# Patient Record
Sex: FEMALE | Race: WHITE | NOT HISPANIC OR LATINO | URBAN - METROPOLITAN AREA
[De-identification: names, ages, dates, MRNs, and addresses within clinical notes are randomized per-mention and may not be internally consistent; named-entity substitution may affect disease eponyms.]

---

## 2018-06-14 ENCOUNTER — OFFICE VISIT (OUTPATIENT)
Dept: PRIMARY CARE | Facility: CLINIC | Age: 63
End: 2018-06-14
Payer: COMMERCIAL

## 2018-06-14 VITALS
HEIGHT: 64 IN | BODY MASS INDEX: 31.82 KG/M2 | WEIGHT: 186.4 LBS | DIASTOLIC BLOOD PRESSURE: 70 MMHG | TEMPERATURE: 97.9 F | SYSTOLIC BLOOD PRESSURE: 110 MMHG

## 2018-06-14 DIAGNOSIS — H65.93 SOM (SECRETORY OTITIS MEDIA), BILATERAL: Primary | ICD-10-CM

## 2018-06-14 DIAGNOSIS — Z12.11 COLON CANCER SCREENING: ICD-10-CM

## 2018-06-14 DIAGNOSIS — E06.3 HASHIMOTO'S THYROIDITIS: ICD-10-CM

## 2018-06-14 DIAGNOSIS — J30.9 ALLERGIC RHINITIS, UNSPECIFIED CHRONICITY, UNSPECIFIED SEASONALITY, UNSPECIFIED TRIGGER: ICD-10-CM

## 2018-06-14 DIAGNOSIS — Z12.39 BREAST CANCER SCREENING: ICD-10-CM

## 2018-06-14 PROCEDURE — 99214 OFFICE O/P EST MOD 30 MIN: CPT | Performed by: FAMILY MEDICINE

## 2018-06-14 RX ORDER — METHYLPREDNISOLONE 4 MG/1
TABLET ORAL
Qty: 21 TABLET | Refills: 0 | Status: SHIPPED | OUTPATIENT
Start: 2018-06-14 | End: 2019-05-16

## 2018-06-14 RX ORDER — LEVOTHYROXINE SODIUM 25 UG/1
TABLET ORAL
COMMUNITY
Start: 2017-04-14 | End: 2018-09-06

## 2018-06-14 NOTE — PROGRESS NOTES
"Subjective      Patient ID: Sarina Lao is a 63 y.o. female.    HPI  Here for 1 wk increasing pressure in ears, no fever, no sore throat, has known allergies and uses nasacort with some relief. No h/a    The following have been reviewed and updated as appropriate in this visit:       Review of Systems    Objective     Vitals:    06/14/18 1719 06/14/18 1728   BP:  110/70   Temp: 36.6 °C (97.9 °F)    Weight: 84.6 kg (186 lb 6.4 oz)    Height: 1.613 m (5' 3.5\")      Body mass index is 32.5 kg/m².    Physical Exam   Constitutional: She appears well-developed and well-nourished. No distress.   HENT:   Head: Normocephalic and atraumatic.   Right Ear: External ear normal.   Left Ear: External ear normal.   Mouth/Throat: Oropharynx is clear and moist.   Nasal turbs blue and boggy  tms R dull, canal erythema  L fluid bubbles     Skin: She is not diaphoretic.   Psychiatric: She has a normal mood and affect. Her behavior is normal.   Vitals reviewed.      Assessment/Plan   Problem List Items Addressed This Visit        Other    Hashimoto's thyroiditis    Relevant Medications    levothyroxine (SYNTHROID) 25 mcg tablet    methylPREDNISolone (MEDROL DOSEPACK) 4 mg tablet    Other Relevant Orders    CBC and differential    Comprehensive metabolic panel    Lipid panel    TSH    T4, free    Breast cancer screening    Relevant Orders    BI SCREENING MAMMOGRAM BILATERAL    Colon cancer screening    Relevant Orders    Fecal Immunochemical      Other Visit Diagnoses     DEB (secretory otitis media), bilateral    -  Primary    medrol dosepak to relieve pressure  re ck at physical in 2 wks    Allergic rhinitis, unspecified chronicity, unspecified seasonality, unspecified trigger        cont nasacort, add allergra          "

## 2018-09-02 LAB
ALBUMIN SERPL-MCNC: 4.1 G/DL (ref 3.6–5.1)
ALBUMIN/GLOB SERPL: 1.6 (CALC) (ref 1–2.5)
ALP SERPL-CCNC: 66 U/L (ref 33–130)
ALT SERPL-CCNC: 16 U/L (ref 6–29)
AST SERPL-CCNC: 15 U/L (ref 10–35)
BASOPHILS # BLD AUTO: 60 CELLS/UL (ref 0–200)
BASOPHILS NFR BLD AUTO: 1.2 %
BILIRUB SERPL-MCNC: 0.9 MG/DL (ref 0.2–1.2)
BUN SERPL-MCNC: 17 MG/DL (ref 7–25)
BUN/CREAT SERPL: ABNORMAL (CALC) (ref 6–22)
CALCIUM SERPL-MCNC: 9.3 MG/DL (ref 8.6–10.4)
CHLORIDE SERPL-SCNC: 103 MMOL/L (ref 98–110)
CHOLEST SERPL-MCNC: 242 MG/DL
CHOLEST/HDLC SERPL: 3.1 (CALC)
CO2 SERPL-SCNC: 29 MMOL/L (ref 20–32)
CREAT SERPL-MCNC: 0.81 MG/DL (ref 0.5–0.99)
EOSINOPHIL # BLD AUTO: 190 CELLS/UL (ref 15–500)
EOSINOPHIL NFR BLD AUTO: 3.8 %
ERYTHROCYTE [DISTWIDTH] IN BLOOD BY AUTOMATED COUNT: 12.7 % (ref 11–15)
GFR SERPL CREATININE-BSD FRML MDRD: 78 ML/MIN/1.73M2
GLOBULIN SER CALC-MCNC: 2.6 G/DL (CALC) (ref 1.9–3.7)
GLUCOSE SERPL-MCNC: 106 MG/DL (ref 65–99)
HCT VFR BLD AUTO: 40.8 % (ref 35–45)
HDLC SERPL-MCNC: 78 MG/DL
HGB BLD-MCNC: 13.8 G/DL (ref 11.7–15.5)
LDLC SERPL CALC-MCNC: 145 MG/DL (CALC)
LYMPHOCYTES # BLD AUTO: 1635 CELLS/UL (ref 850–3900)
LYMPHOCYTES NFR BLD AUTO: 32.7 %
MCH RBC QN AUTO: 31.3 PG (ref 27–33)
MCHC RBC AUTO-ENTMCNC: 33.8 G/DL (ref 32–36)
MCV RBC AUTO: 92.5 FL (ref 80–100)
MONOCYTES # BLD AUTO: 385 CELLS/UL (ref 200–950)
MONOCYTES NFR BLD AUTO: 7.7 %
NEUTROPHILS # BLD AUTO: 2730 CELLS/UL (ref 1500–7800)
NEUTROPHILS NFR BLD AUTO: 54.6 %
NONHDLC SERPL-MCNC: 164 MG/DL (CALC)
PLATELET # BLD AUTO: 286 THOUSAND/UL (ref 140–400)
PMV BLD REES-ECKER: 11.1 FL (ref 7.5–12.5)
POTASSIUM SERPL-SCNC: 4.2 MMOL/L (ref 3.5–5.3)
PROT SERPL-MCNC: 6.7 G/DL (ref 6.1–8.1)
RBC # BLD AUTO: 4.41 MILLION/UL (ref 3.8–5.1)
SODIUM SERPL-SCNC: 140 MMOL/L (ref 135–146)
T4 FREE SERPL-MCNC: 0.9 NG/DL (ref 0.8–1.8)
TRIGL SERPL-MCNC: 83 MG/DL
TSH SERPL-ACNC: 3.92 MIU/L (ref 0.4–4.5)
WBC # BLD AUTO: 5 THOUSAND/UL (ref 3.8–10.8)

## 2018-09-06 ENCOUNTER — OFFICE VISIT (OUTPATIENT)
Dept: PRIMARY CARE | Facility: CLINIC | Age: 63
End: 2018-09-06
Payer: COMMERCIAL

## 2018-09-06 VITALS
RESPIRATION RATE: 18 BRPM | WEIGHT: 183.4 LBS | TEMPERATURE: 98 F | HEART RATE: 91 BPM | DIASTOLIC BLOOD PRESSURE: 90 MMHG | SYSTOLIC BLOOD PRESSURE: 110 MMHG | HEIGHT: 63 IN | BODY MASS INDEX: 32.5 KG/M2 | OXYGEN SATURATION: 98 %

## 2018-09-06 DIAGNOSIS — E78.2 MIXED HYPERLIPIDEMIA: ICD-10-CM

## 2018-09-06 DIAGNOSIS — H93.13 TINNITUS OF BOTH EARS: ICD-10-CM

## 2018-09-06 DIAGNOSIS — Z12.39 BREAST CANCER SCREENING: ICD-10-CM

## 2018-09-06 DIAGNOSIS — Z23 FLU VACCINE NEED: Primary | ICD-10-CM

## 2018-09-06 DIAGNOSIS — Z00.00 ENCOUNTER FOR GENERAL ADULT MEDICAL EXAMINATION WITHOUT ABNORMAL FINDINGS: ICD-10-CM

## 2018-09-06 DIAGNOSIS — E06.3 HASHIMOTO'S THYROIDITIS: ICD-10-CM

## 2018-09-06 DIAGNOSIS — Z12.11 COLON CANCER SCREENING: ICD-10-CM

## 2018-09-06 DIAGNOSIS — K59.00 CONSTIPATION, UNSPECIFIED CONSTIPATION TYPE: ICD-10-CM

## 2018-09-06 PROCEDURE — 99213 OFFICE O/P EST LOW 20 MIN: CPT | Mod: 25 | Performed by: FAMILY MEDICINE

## 2018-09-06 PROCEDURE — 99396 PREV VISIT EST AGE 40-64: CPT | Mod: 25 | Performed by: FAMILY MEDICINE

## 2018-09-06 PROCEDURE — 90471 IMMUNIZATION ADMIN: CPT | Performed by: FAMILY MEDICINE

## 2018-09-06 PROCEDURE — 90686 IIV4 VACC NO PRSV 0.5 ML IM: CPT | Performed by: FAMILY MEDICINE

## 2018-09-06 RX ORDER — LEVOTHYROXINE SODIUM 25 UG/1
25 TABLET ORAL
Qty: 30 TABLET | Refills: 5 | Status: SHIPPED | OUTPATIENT
Start: 2018-09-06 | End: 2019-11-05

## 2018-09-06 ASSESSMENT — ENCOUNTER SYMPTOMS
ARTHRALGIAS: 1
CONSTIPATION: 1
CONSTITUTIONAL NEGATIVE: 1
NEUROLOGICAL NEGATIVE: 1
SLEEP DISTURBANCE: 1
HEMATOLOGIC/LYMPHATIC NEGATIVE: 1
CARDIOVASCULAR NEGATIVE: 1
RESPIRATORY NEGATIVE: 1
EYES NEGATIVE: 1
ALLERGIC/IMMUNOLOGIC NEGATIVE: 1

## 2018-09-06 NOTE — ASSESSMENT & PLAN NOTE
Worse at night with fullness in ears, no change with medrol, refer ENT for opinion  Suggest flavenoids

## 2018-09-06 NOTE — ASSESSMENT & PLAN NOTE
Will get a coronary calcium score to assess risk and start a statin as indicated.  Her levels have increased since last year.

## 2018-09-06 NOTE — PROGRESS NOTES
"Subjective      Patient ID: Sarina Lao is a 63 y.o. female.  1955      [facility] for her wellness check.  Her ears are no better after taking the Medrol.  She always feels like her head is full and popping in her ears.  She has ringing in the years which gets worse at night.  She is a highly stressful job and just got a promotion so thinks her symptoms may be worse.    The following have been reviewed and updated as appropriate in this visit:  Tobacco  Allergies  Meds  Problems  Med Hx  Surg Hx  Fam Hx  Soc Hx        Review of Systems   Constitutional: Negative.    HENT: Positive for ear pain and tinnitus.    Eyes: Negative.    Respiratory: Negative.    Cardiovascular: Negative.    Gastrointestinal: Positive for constipation.   Genitourinary: Negative.    Musculoskeletal: Positive for arthralgias.   Skin: Negative.    Allergic/Immunologic: Negative.    Neurological: Negative.    Hematological: Negative.    Psychiatric/Behavioral: Positive for sleep disturbance.       Objective     Vitals:    09/06/18 1831   BP: 110/90   BP Location: Left upper arm   Patient Position: Sitting   Pulse: 91   Resp: 18   Temp: 36.7 °C (98 °F)   SpO2: 98%   Weight: 83.2 kg (183 lb 6.4 oz)   Height: 1.6 m (5' 3\")     Body mass index is 32.49 kg/m².    Physical Exam   Constitutional: She is oriented to person, place, and time. She appears well-developed and well-nourished. No distress.   HENT:   Head: Normocephalic and atraumatic.   Right Ear: External ear normal.   Left Ear: External ear normal.   Nose: Nose normal.   Mouth/Throat: Oropharynx is clear and moist.   Eyes: Conjunctivae and EOM are normal. Pupils are equal, round, and reactive to light.   Neck: Normal range of motion. Neck supple. Carotid bruit is not present. No thyroid mass and no thyromegaly present.   Cardiovascular: Normal rate, regular rhythm, normal heart sounds and intact distal pulses.  Exam reveals no gallop and no friction rub.    No murmur " heard.  Pulmonary/Chest: Effort normal and breath sounds normal. No respiratory distress. She has no wheezes. She has no rales. She exhibits no tenderness.   Abdominal: Soft. Bowel sounds are normal. She exhibits no distension and no mass. There is no tenderness. There is no rebound and no guarding.   Musculoskeletal: Normal range of motion. She exhibits deformity. She exhibits no edema.   Lymphadenopathy:     She has no cervical adenopathy.   Neurological: She is alert and oriented to person, place, and time. She displays normal reflexes. No cranial nerve deficit or sensory deficit. She exhibits normal muscle tone. Coordination normal.   Skin: Skin is warm and dry. Capillary refill takes less than 2 seconds. She is not diaphoretic.   Psychiatric: She has a normal mood and affect. Her behavior is normal. Judgment and thought content normal.   Nursing note and vitals reviewed.      Assessment/Plan   Problem List Items Addressed This Visit        Other    Hashimoto's thyroiditis     Some constipation, itching, insomnia, trouble with wt. Had thy abs 900 last year, tsh 3+ off thyroxine, will restart low dose  tsh in 3 mos         Relevant Medications    levothyroxine (SYNTHROID) 25 mcg tablet    Mixed hyperlipidemia     Will get a coronary calcium score to assess risk and start a statin as indicated.  Her levels have increased since last year.         Tinnitus of both ears     Worse at night with fullness in ears, no change with medrol, refer ENT for opinion  Suggest flavenoids           Other Visit Diagnoses     Encounter for general adult medical examination without abnormal findings    -  Primary    age appropriate exam, to see gyn for pap  declines flu shot today  will call when Shingrix in    Constipation, unspecified constipation type        Relevant Orders    [facility] Colonoscopy [facility]    Colon cancer screening        Relevant Orders    [facility] Colonoscopy [facility]    Breast cancer screening        Relevant  Orders    BI SCREENING MAMMOGRAM BILATERAL

## 2018-09-06 NOTE — ASSESSMENT & PLAN NOTE
Some constipation, itching, insomnia, trouble with wt. Had thy abs 900 last year, tsh 3+ off thyroxine, will restart low dose  tsh in 3 mos

## 2019-05-16 ENCOUNTER — OFFICE VISIT (OUTPATIENT)
Dept: FAMILY MEDICINE | Facility: CLINIC | Age: 64
End: 2019-05-16
Payer: COMMERCIAL

## 2019-05-16 VITALS
HEIGHT: 63 IN | BODY MASS INDEX: 33.66 KG/M2 | TEMPERATURE: 97.6 F | DIASTOLIC BLOOD PRESSURE: 64 MMHG | HEART RATE: 96 BPM | WEIGHT: 190 LBS | SYSTOLIC BLOOD PRESSURE: 100 MMHG | OXYGEN SATURATION: 95 %

## 2019-05-16 DIAGNOSIS — M25.619: ICD-10-CM

## 2019-05-16 DIAGNOSIS — Z00.00 ROUTINE GENERAL MEDICAL EXAMINATION AT A HEALTH CARE FACILITY: Primary | ICD-10-CM

## 2019-05-16 DIAGNOSIS — E03.9 ACQUIRED HYPOTHYROIDISM: ICD-10-CM

## 2019-05-16 DIAGNOSIS — E78.2 MIXED HYPERLIPIDEMIA: Chronic | ICD-10-CM

## 2019-05-16 DIAGNOSIS — H91.93 DECREASED HEARING OF BOTH EARS: ICD-10-CM

## 2019-05-16 PROCEDURE — 99396 PREV VISIT EST AGE 40-64: CPT | Mod: 25 | Performed by: FAMILY MEDICINE

## 2019-05-16 PROCEDURE — 99213 OFFICE O/P EST LOW 20 MIN: CPT | Mod: 25 | Performed by: FAMILY MEDICINE

## 2019-05-16 NOTE — PROGRESS NOTES
Sarina Lao is a 64 y.o. female and is here for a comprehensive physical exam. In addition to health maintenance, the patient reports the following    Sleep has been an issue since thomas  Has trouble falling alseep   Wakes several times  She tried good sleep hygeine  Had modified phone light  Doesn't have to urinate    Goes to sleep 10  Falls asleep: 4710-0332  Doesn't have to get up: 630  Wakes for the day 5-530  Affecting work    Not refreshed on waking    Pt has snoring issues and her and her  sleep in separate rooms    She is wondering how to loose weight: she hasn't really tried anything    She was on synthroid at one point but not sure if she still needs  She doesn't really think it did anything      Patient Active Problem List   Diagnosis   • Arthritis   • Hashimoto's thyroiditis   • Routine general medical examination at a health care facility   • Tinnitus of both ears   • Mixed hyperlipidemia   • Acquired hypothyroidism   • Decreased hearing of both ears                Allergies   Allergen Reactions   • No Known Allergies        Current Outpatient Prescriptions on File Prior to Visit   Medication Sig Dispense Refill   • levothyroxine (SYNTHROID) 25 mcg tablet Take 1 tablet (25 mcg total) by mouth daily. (Patient not taking: Reported on 5/16/2019 ) 30 tablet 5     No current facility-administered medications on file prior to visit.        Past Medical History:   Diagnosis Date   • Endometriosis    • Hypothyroidism        Past Surgical History:   Procedure Laterality Date   • DIAGNOSTIC LAPAROSCOPY         Family History   Problem Relation Age of Onset   • Heart failure Mother 83        CHF   • Dementia Mother    • Cancer Father 70        mesothelioma       Social History     Social History   • Marital status:      Spouse name: N/A   • Number of children: N/A   • Years of education: N/A     Social History Main Topics   • Smoking status: Never Smoker   • Smokeless tobacco: Never Used   •  "Alcohol use 1.2 - 1.8 oz/week     2 - 3 Glasses of wine per week   • Drug use: No   • Sexual activity: Yes     Partners: Male     Other Topics Concern   • None     Social History Narrative    Feels safe at home, lives w/ marcelobd       Nutrition and exercise history:   Exercise Plan: not much   Nutrition Plan: could be better    Immunization History   Administered Date(s) Administered   • Influenza Vaccine Quadrivalent Preservative Free 6 Mons and Up 09/06/2018   • Influenza, Unspecified 09/06/2018   • MMR 05/11/2019   • Tdap 10/13/2013   • Zoster 04/14/2017       Review of systems:  All other systems reviewed and negative except as noted in the HPI.     Objective:  /64 (BP Location: Left upper arm, Patient Position: Sitting)   Pulse 96   Temp 36.4 °C (97.6 °F) (Oral)   Ht 1.6 m (5' 3\")   Wt 86.2 kg (190 lb)   SpO2 95%   BMI 33.66 kg/m²     General Appearance:    Alert, cooperative, no distress, appears stated age   Head:    Normocephalic, without obvious abnormality, atraumatic   Eyes:    PERRL, conjunctiva/corneas clear, EOM's intact, fundi     benign, both eyes        Ears:    Normal TM's and external ear canals, both ears   Nose:   Nares normal, septum midline, mucosa normal, no drainage    or sinus   tenderness   Throat:   Lips, mucosa, and tongue normal; teeth and gums normal   Neck:   Supple, symmetrical, trachea midline, no adenopathy;        thyroid:  No enlargement/tenderness/nodules; no carotid    bruit or JVD   Back:     Symmetric, no curvature, ROM normal, no CVA tenderness   Lungs:     Clear to auscultation bilaterally, respirations unlabored   Chest wall:    No tenderness or deformity   Heart:    Regular rate and rhythm, S1 and S2 normal, no murmur, rub   or gallop   Abdomen:     Soft, non-tender, bowel sounds active all four quadrants,     no masses, no organomegaly   Genitalia:    Normal male without lesion, discharge or tenderness   Rectal:    Normal tone, normal prostate, no masses or " tenderness;    guaiac negative stool   Extremities:  Musculoskeletal:   Extremities normal, atraumatic, no cyanosis or edema    No injury or deformity   Pulses:   2+ and symmetric all extremities   Skin:   Skin color, texture, turgor normal, no rashes or lesions   Lymph nodes:   Cervical, supraclavicular, and axillary nodes normal   Neurologic:    Behavior/  Emotional:   CNII-XII intact. Normal strength, sensation and reflexes       throughout    Appropriate, cooperative       Assessment & Plan:    Problem List Items Addressed This Visit        Medium    Acquired hypothyroidism (Chronic)     Assessment: Uncertain status  Plan: We will keep off medication and recheck blood work            Unprioritized    Routine general medical examination at a health care facility - Primary     Patient and I discussed healthy ways to lose weight.  Including weight watchers and [employer] weight loss system at [place].  We will get lab work  Patient has not done colonoscopy.  We ordered fit test today  Mammogram ordered and referral to GYN placed         Relevant Orders    CBC and Differential    Comprehensive metabolic panel    Lipid panel    TSH w reflex FT4    Vitamin D 25 hydroxy    FIT    Ambulatory referral to Gynecology    BI SCREENING MAMMOGRAM BILATERAL    Mixed hyperlipidemia (Chronic)     Assessment: Uncertain status     plan: We will check lab work         Decreased hearing of both ears     Assessment: Uncertain status  Plan we will refer to audiology           Other Visit Diagnoses     Decreased abduction of shoulder joint, unspecified laterality        Relevant Orders    Ambulatory referral to Audiology            Body mass index is 33.66 kg/m².  BMI is elevated. Plan: see a/p.      Current Outpatient Prescriptions:   •  levothyroxine (SYNTHROID) 25 mcg tablet, Take 1 tablet (25 mcg total) by mouth daily. (Patient not taking: Reported on 5/16/2019 ), Disp: 30 tablet, Rfl: 5    Follow up in 6 months

## 2019-05-17 PROBLEM — E03.9 ACQUIRED HYPOTHYROIDISM: Chronic | Status: ACTIVE | Noted: 2019-05-17

## 2019-05-17 PROBLEM — E66.09 CLASS 1 OBESITY DUE TO EXCESS CALORIES WITHOUT SERIOUS COMORBIDITY WITH BODY MASS INDEX (BMI) OF 33.0 TO 33.9 IN ADULT: Status: ACTIVE | Noted: 2019-05-17

## 2019-05-17 PROBLEM — E03.9 ACQUIRED HYPOTHYROIDISM: Status: ACTIVE | Noted: 2019-05-17

## 2019-05-17 PROBLEM — Z00.00 ROUTINE GENERAL MEDICAL EXAMINATION AT A HEALTH CARE FACILITY: Status: ACTIVE | Noted: 2018-06-14

## 2019-05-17 PROBLEM — E66.811 CLASS 1 OBESITY DUE TO EXCESS CALORIES WITHOUT SERIOUS COMORBIDITY WITH BODY MASS INDEX (BMI) OF 33.0 TO 33.9 IN ADULT: Status: ACTIVE | Noted: 2019-05-17

## 2019-05-17 PROBLEM — E78.2 MIXED HYPERLIPIDEMIA: Chronic | Status: ACTIVE | Noted: 2018-09-06

## 2019-05-17 PROBLEM — H91.93 DECREASED HEARING OF BOTH EARS: Status: ACTIVE | Noted: 2019-05-17

## 2019-05-17 NOTE — ASSESSMENT & PLAN NOTE
Patient and I discussed healthy ways to lose weight.  Including weight watchers and [employer] [facility] loss system at [place].  We will get lab work  Patient has not done colonoscopy.  We ordered fit test today  Mammogram ordered and referral to GYN placed

## 2019-08-31 LAB
25(OH)D3 SERPL-MCNC: 17 NG/ML (ref 30–100)
BASOPHILS # BLD AUTO: 41 CELLS/UL (ref 0–200)
BASOPHILS NFR BLD AUTO: 0.7 %
EOSINOPHIL # BLD AUTO: 171 CELLS/UL (ref 15–500)
EOSINOPHIL NFR BLD AUTO: 2.9 %
ERYTHROCYTE [DISTWIDTH] IN BLOOD BY AUTOMATED COUNT: 12.8 % (ref 11–15)
HCT VFR BLD AUTO: 40.2 % (ref 35–45)
HGB BLD-MCNC: 13.4 G/DL (ref 11.7–15.5)
LYMPHOCYTES # BLD AUTO: 1746 CELLS/UL (ref 850–3900)
LYMPHOCYTES NFR BLD AUTO: 29.6 %
MCH RBC QN AUTO: 30.9 PG (ref 27–33)
MCHC RBC AUTO-ENTMCNC: 33.3 G/DL (ref 32–36)
MCV RBC AUTO: 92.8 FL (ref 80–100)
MONOCYTES # BLD AUTO: 490 CELLS/UL (ref 200–950)
MONOCYTES NFR BLD AUTO: 8.3 %
NEUTROPHILS # BLD AUTO: 3452 CELLS/UL (ref 1500–7800)
NEUTROPHILS NFR BLD AUTO: 58.5 %
PLATELET # BLD AUTO: 235 THOUSAND/UL (ref 140–400)
PMV BLD REES-ECKER: 10.5 FL (ref 7.5–12.5)
RBC # BLD AUTO: 4.33 MILLION/UL (ref 3.8–5.1)
TSH SERPL-ACNC: 3.25 MIU/L (ref 0.4–4.5)
WBC # BLD AUTO: 5.9 THOUSAND/UL (ref 3.8–10.8)

## 2019-09-04 ENCOUNTER — TELEPHONE (OUTPATIENT)
Dept: FAMILY MEDICINE | Facility: CLINIC | Age: 64
End: 2019-09-04

## 2019-09-04 NOTE — TELEPHONE ENCOUNTER
Called pt to see if labs were done, left vm to return call. Called jayda, spoke with Pat, she stated pt was advised to return for labs due to her not fasting. Pt has not returned yet

## 2019-09-15 LAB
ALBUMIN SERPL-MCNC: 4.2 G/DL (ref 3.6–5.1)
ALBUMIN/GLOB SERPL: 1.8 (CALC) (ref 1–2.5)
ALP SERPL-CCNC: 65 U/L (ref 33–130)
ALT SERPL-CCNC: 22 U/L (ref 6–29)
AST SERPL-CCNC: 18 U/L (ref 10–35)
BILIRUB SERPL-MCNC: 0.7 MG/DL (ref 0.2–1.2)
BUN SERPL-MCNC: 16 MG/DL (ref 7–25)
BUN/CREAT SERPL: NORMAL (CALC) (ref 6–22)
CALCIUM SERPL-MCNC: 9.6 MG/DL (ref 8.6–10.4)
CHLORIDE SERPL-SCNC: 105 MMOL/L (ref 98–110)
CHOLEST SERPL-MCNC: 256 MG/DL
CHOLEST/HDLC SERPL: 3.1 (CALC)
CO2 SERPL-SCNC: 31 MMOL/L (ref 20–32)
CREAT SERPL-MCNC: 0.77 MG/DL (ref 0.5–0.99)
GLOBULIN SER CALC-MCNC: 2.3 G/DL (CALC) (ref 1.9–3.7)
GLUCOSE SERPL-MCNC: 95 MG/DL (ref 65–99)
HDLC SERPL-MCNC: 82 MG/DL
LDLC SERPL CALC-MCNC: 156 MG/DL (CALC)
NONHDLC SERPL-MCNC: 174 MG/DL (CALC)
POTASSIUM SERPL-SCNC: 4.3 MMOL/L (ref 3.5–5.3)
PROT SERPL-MCNC: 6.5 G/DL (ref 6.1–8.1)
QUEST EGFR NON-AFR. AMERICAN: 83 ML/MIN/1.73M2
SODIUM SERPL-SCNC: 142 MMOL/L (ref 135–146)
TRIGL SERPL-MCNC: 79 MG/DL

## 2019-09-17 ENCOUNTER — TELEPHONE (OUTPATIENT)
Dept: FAMILY MEDICINE | Facility: CLINIC | Age: 64
End: 2019-09-17

## 2019-09-17 DIAGNOSIS — R40.0 DAYTIME SLEEPINESS: Primary | ICD-10-CM

## 2019-09-17 NOTE — TELEPHONE ENCOUNTER
Patient would like a call back she states she missed placed the recommendations that was given to her for a sleep study and a gyn doctor.  Thank you

## 2019-09-18 PROBLEM — R40.0 DAYTIME SLEEPINESS: Status: ACTIVE | Noted: 2019-09-18

## 2019-09-18 NOTE — TELEPHONE ENCOUNTER
There is referral from 6/16/19 for Dr. Pitt. (does she need a new one)  Looks like I forgot to put an order in for her sleep study. It has been ordered today.   She missed and appt w/ me yesterday (Tuesday)  Please reschedule w/me Due for 6 month check

## 2019-09-19 NOTE — TELEPHONE ENCOUNTER
Chol was a little high but not in danger zone. Vit d was low. Would start 2000IU of vit d. All else was normal

## 2019-09-19 NOTE — TELEPHONE ENCOUNTER
Patient called back to get clarity on sleep study, I spoke to patient and explained she understands direction for sleep study .     Patient scheduled for 11/5 but is asking if she can be called with the results of her labs that were done 9/14     pls advise

## 2019-09-19 NOTE — TELEPHONE ENCOUNTER
Left detailed message providing patient with:    [facility]# [phone]; I also advised patient that sleep study was order and that [facility] would be in contact within the next couple of days if she does not hear from them to call [facility] at n# [phone]. Advised patient to call back with any further questions or concerns.

## 2019-09-25 NOTE — TELEPHONE ENCOUNTER
Pt advised. Per pt she received letter from Dr. Spencer advising her to start 5000iu of Vit D. What dose should pt be starting?

## 2019-09-25 NOTE — TELEPHONE ENCOUNTER
The dose is 5000iu (international units) of Vit D3. If she is still confused, I would advise her to take that info to the pharm and they can show her the bottles.

## 2019-10-03 ENCOUNTER — TRANSCRIBE ORDERS (OUTPATIENT)
Dept: SCHEDULING | Age: 64
End: 2019-10-03

## 2019-10-03 DIAGNOSIS — G47.33 OBSTRUCTIVE SLEEP APNEA (ADULT) (PEDIATRIC): ICD-10-CM

## 2019-10-03 DIAGNOSIS — R40.0 SOMNOLENCE: Primary | ICD-10-CM

## 2019-11-05 ENCOUNTER — OFFICE VISIT (OUTPATIENT)
Dept: FAMILY MEDICINE | Facility: CLINIC | Age: 64
End: 2019-11-05
Payer: COMMERCIAL

## 2019-11-05 VITALS
HEIGHT: 63 IN | OXYGEN SATURATION: 94 % | HEART RATE: 84 BPM | BODY MASS INDEX: 33.84 KG/M2 | TEMPERATURE: 98.2 F | SYSTOLIC BLOOD PRESSURE: 110 MMHG | DIASTOLIC BLOOD PRESSURE: 70 MMHG | WEIGHT: 191 LBS

## 2019-11-05 DIAGNOSIS — E78.2 MIXED HYPERLIPIDEMIA: Chronic | ICD-10-CM

## 2019-11-05 DIAGNOSIS — E03.9 ACQUIRED HYPOTHYROIDISM: Primary | Chronic | ICD-10-CM

## 2019-11-05 DIAGNOSIS — F41.8 SITUATIONAL ANXIETY: ICD-10-CM

## 2019-11-05 PROCEDURE — 99213 OFFICE O/P EST LOW 20 MIN: CPT | Performed by: FAMILY MEDICINE

## 2019-11-05 RX ORDER — ERGOCALCIFEROL 1.25 MG/1
50000 CAPSULE ORAL WEEKLY
COMMUNITY
End: 2019-11-08

## 2019-11-05 RX ORDER — VIT C/E/ZN/COPPR/LUTEIN/ZEAXAN 250MG-90MG
5000 CAPSULE ORAL DAILY
COMMUNITY

## 2019-11-05 NOTE — PROGRESS NOTES
Subjective      Patient ID: Sarina Lao is a 64 y.o. female.  1955      HPI  Having sleep study    She has been off the synthroid and feeling pretty well    Went over lipid    Vertigo:  It came out of no where  Woke up and the room was spinning. Was worse w/ head movement  Went to ENT  She did go to urgent care. She was given pred that didn't really help  ras doe jennifere relieved it  Congestion and pressure in ear got better into sept  Was given home exercises to do     As we get into change of time year  She gets a lot of somtach upset from anxiety   More stress at work  She gets a fast idle in her chest  Once she gets going she feel better  No associated symptoms  Weaning down on coffee  Very situational      The following have been reviewed and updated as appropriate in this visit:  Past Medical History:   Diagnosis Date   • Endometriosis    • Hypothyroidism      Past Surgical History:   Procedure Laterality Date   • DIAGNOSTIC LAPAROSCOPY       Social History     Socioeconomic History   • Marital status:      Spouse name: Not on file   • Number of children: Not on file   • Years of education: Not on file   • Highest education level: Not on file   Occupational History   • Not on file   Social Needs   • Financial resource strain: Not on file   • Food insecurity:     Worry: Not on file     Inability: Not on file   • Transportation needs:     Medical: Not on file     Non-medical: Not on file   Tobacco Use   • Smoking status: Never Smoker   • Smokeless tobacco: Never Used   Substance and Sexual Activity   • Alcohol use: Yes     Alcohol/week: 2.0 - 3.0 standard drinks     Types: 2 - 3 Glasses of wine per week   • Drug use: No   • Sexual activity: Yes     Partners: Male   Lifestyle   • Physical activity:     Days per week: Not on file     Minutes per session: Not on file   • Stress: Not on file   Relationships   • Social connections:     Talks on phone: Not on file     Gets together: Not on file      "Attends [employer] service: Not on file     Active member of club or organization: Not on file     Attends meetings of clubs or organizations: Not on file     Relationship status: Not on file   • Intimate partner violence:     Fear of current or ex partner: Not on file     Emotionally abused: Not on file     Physically abused: Not on file     Forced sexual activity: Not on file   Other Topics Concern   • Not on file   Social History Narrative    Feels safe at home, lives w/ husanbd     Family History   Problem Relation Age of Onset   • Heart failure Biological Mother 83        CHF   • Dementia Biological Mother    • Cancer Biological Father 70        mesothelioma     No known allergies  Current Outpatient Medications   Medication Sig Dispense Refill   • cholecalciferol, vitamin D3, 5,000 unit (125 mcg) capsule Take 5,000 Units by mouth daily.       No current facility-administered medications for this visit.        Review of Systems   All other systems reviewed and are negative.      Objective     Vitals:    11/05/19 1605   BP: 110/70   BP Location: Left upper arm   Patient Position: Sitting   Pulse: 84   Temp: 36.8 °C (98.2 °F)   TempSrc: Oral   SpO2: 94%   Weight: 86.6 kg (191 lb)   Height: 1.6 m (5' 3\")     Body mass index is 33.83 kg/m².    Physical Exam   Constitutional: She is oriented to person, place, and time. She appears well-developed and well-nourished.   HENT:   Head: Normocephalic and atraumatic.   Eyes: Conjunctivae are normal.   Neck: Neck supple.   Cardiovascular: Normal rate, regular rhythm and normal heart sounds.   Pulmonary/Chest: Effort normal and breath sounds normal.   Neurological: She is alert and oriented to person, place, and time.   Skin: Skin is warm and dry.   Psychiatric: She has a normal mood and affect. Her behavior is normal. Judgment and thought content normal.   Nursing note and vitals reviewed.      Assessment/Plan   Diagnoses and all orders for this visit:    Acquired " hypothyroidism (Primary)  Assessment & Plan:  A: stable  P: cont current meds  Will cont to monitor off meds      Mixed hyperlipidemia  Assessment & Plan:  A: stable  P: cont current plan      Situational anxiety  Assessment & Plan:  A: worsening  P: pt is not ready for meds yet  Will monitor

## 2019-11-08 PROBLEM — F41.8 SITUATIONAL ANXIETY: Status: ACTIVE | Noted: 2019-11-08

## 2019-11-08 PROBLEM — H81.13 BENIGN PAROXYSMAL POSITIONAL VERTIGO DUE TO BILATERAL VESTIBULAR DISORDER: Status: ACTIVE | Noted: 2019-11-08

## 2019-11-14 ENCOUNTER — HOSPITAL ENCOUNTER (OUTPATIENT)
Dept: SLEEP MEDICINE | Facility: HOSPITAL | Age: 64
Discharge: HOME | End: 2019-11-14
Attending: FAMILY MEDICINE
Payer: COMMERCIAL

## 2019-11-14 DIAGNOSIS — G47.33 OBSTRUCTIVE SLEEP APNEA (ADULT) (PEDIATRIC): ICD-10-CM

## 2019-11-14 DIAGNOSIS — R40.0 SOMNOLENCE: ICD-10-CM

## 2019-11-14 PROCEDURE — G0399 HOME SLEEP TEST/TYPE 3 PORTA: HCPCS

## 2020-01-15 ENCOUNTER — TELEPHONE (OUTPATIENT)
Dept: FAMILY MEDICINE | Facility: CLINIC | Age: 65
End: 2020-01-15

## 2020-01-15 NOTE — TELEPHONE ENCOUNTER
pls call pt. Her sleep study was neg for significant sleep apnea. We can discuss further on next visit if she is still having issues

## 2020-01-15 NOTE — LETTER
Dear Sarina Lao,    We have made 3 attempts to contact you regarding your results without success. The Doctor has instructed us to inform you that, regarding your results, sleep study was negative for significant sleep apnea. We can discuss further on next visit if you are still having issues    Sincerely,

## 2020-04-28 ENCOUNTER — TELEPHONE (OUTPATIENT)
Dept: FAMILY MEDICINE | Facility: CLINIC | Age: 65
End: 2020-04-28

## 2020-04-30 ENCOUNTER — TELEMEDICINE (OUTPATIENT)
Dept: FAMILY MEDICINE | Facility: CLINIC | Age: 65
End: 2020-04-30
Payer: COMMERCIAL

## 2020-04-30 DIAGNOSIS — L24.89 IRRITANT CONTACT DERMATITIS DUE TO OTHER AGENTS: Primary | ICD-10-CM

## 2020-04-30 PROCEDURE — 99213 OFFICE O/P EST LOW 20 MIN: CPT | Mod: 95 | Performed by: FAMILY MEDICINE

## 2020-04-30 RX ORDER — TRIAMCINOLONE ACETONIDE 0.25 MG/G
CREAM TOPICAL 2 TIMES DAILY
Qty: 30 G | Refills: 3 | Status: SHIPPED | OUTPATIENT
Start: 2020-04-30 | End: 2021-11-18

## 2020-05-01 PROBLEM — L24.89 IRRITANT CONTACT DERMATITIS DUE TO OTHER AGENTS: Status: ACTIVE | Noted: 2020-05-01

## 2020-05-01 NOTE — ASSESSMENT & PLAN NOTE
A: new  P: will try to limit irritants to the ear (glasses) and abd (pant button)  Will also moisturize w/ Eucerin and use steroid cream bid as needed  Pt to reach out if there is issue

## 2020-12-30 NOTE — PROGRESS NOTES
Verification of Patient Location:  The patient affirms they are currently located in the following state: [place]    Request for Consent:   Video Encounter   Angeli, my name is Nickie Spencer MD.  Before we proceed, can you please verify your identification by telling me your full name and date of birth?done  Can you tell me who is in the room with you? alone    You and I are about to have a telemedicine check-in or visit because you have requested it.  This is a live video-conference.  I am a real person, speaking to you in real time.  There is no one else with me on the video-conference.  However, when we use ([employer], [employer], etc) it is important for you to know that the video-conference may not be secure or private.  I am not recording this conversation and I am asking you not to record it.  This telemedicine visit will be billed to your health insurance or you, if you are self-insured.  You understand you will be responsible for any copayments or coinsurances that apply to your telemedicine visit.  Before starting our telemedicine visit, I am required to get your consent for this virtual check-in or visit by telemedicine. Do you consent?    Patient Response to Request for Consent:  Yes      Visit Documentation:  Subjective     Patient ID: Sarina Lao is a 65 y.o. female.  1955      HPI  For a while she has had recurring itching in 2 areas: behind right ear  Very scaly. Comes and goes  Wears glasses  That makes it worse    Around naval: dark area gets red  Gets itchy off and on  Cortisone will make it burn but then does cause improvement  Worse w/ warmer temps  No sattelite lesions    She was seen by derm in the past  She was given cream that worked well      The following have been reviewed and updated as appropriate in this visit:  Past Medical History:   Diagnosis Date   • Endometriosis    • Hypothyroidism      Past Surgical History:   Procedure Laterality Date   • DIAGNOSTIC LAPAROSCOPY  Pt here for MD f/u visit and Faslodex injection for BRCA. Pt recently hospitalized for severe anemia. Pt reports \"feeling better since hospital stay and receiving blood transfusions\". Some fatigue today. Denies CP, SOB. Abemaciclib is on hold.    Educatio       Social History     Socioeconomic History   • Marital status:      Spouse name: Not on file   • Number of children: Not on file   • Years of education: Not on file   • Highest education level: Not on file   Occupational History   • Not on file   Social Needs   • Financial resource strain: Not on file   • Food insecurity:     Worry: Not on file     Inability: Not on file   • Transportation needs:     Medical: Not on file     Non-medical: Not on file   Tobacco Use   • Smoking status: Never Smoker   • Smokeless tobacco: Never Used   Substance and Sexual Activity   • Alcohol use: Yes     Alcohol/week: 2.0 - 3.0 standard drinks     Types: 2 - 3 Glasses of wine per week   • Drug use: No   • Sexual activity: Yes     Partners: Male   Lifestyle   • Physical activity:     Days per week: Not on file     Minutes per session: Not on file   • Stress: Not on file   Relationships   • Social connections:     Talks on phone: Not on file     Gets together: Not on file     Attends Taoist service: Not on file     Active member of club or organization: Not on file     Attends meetings of clubs or organizations: Not on file     Relationship status: Not on file   • Intimate partner violence:     Fear of current or ex partner: Not on file     Emotionally abused: Not on file     Physically abused: Not on file     Forced sexual activity: Not on file   Other Topics Concern   • Not on file   Social History Narrative    Feels safe at home, lives w/ husanbd     Family History   Problem Relation Age of Onset   • Heart failure Biological Mother 83        CHF   • Dementia Biological Mother    • Cancer Biological Father 70        mesothelioma     No known allergies  Current Outpatient Medications   Medication Sig Dispense Refill   • cholecalciferol, vitamin D3, 5,000 unit (125 mcg) capsule Take 5,000 Units by mouth daily.     • triamcinolone (KENALOG) 0.025 % cream Apply topically 2 (two) times a day. 30 g 3     No current  facility-administered medications for this visit.        Review of Systems   All other systems reviewed and are negative.        Assessment/Plan   Diagnoses and all orders for this visit:    Irritant contact dermatitis due to other agents (Primary)  Assessment & Plan:  A: new  P: will try to limit irritants to the ear (glasses) and abd (pant button)  Will also moisturize w/ Eucerin and use steroid cream bid as needed  Pt to reach out if there is issue    Orders:  -     triamcinolone (KENALOG) 0.025 % cream; Apply topically 2 (two) times a day.      Time Spent in Medical Discussion During This Encounter:      Total encounter time, with >50 percent spent counseling/coordinatin minutes

## 2021-02-17 ENCOUNTER — TELEPHONE (OUTPATIENT)
Dept: FAMILY MEDICINE | Facility: CLINIC | Age: 66
End: 2021-02-17

## 2021-02-17 DIAGNOSIS — Z12.31 ENCOUNTER FOR SCREENING MAMMOGRAM FOR MALIGNANT NEOPLASM OF BREAST: Primary | ICD-10-CM

## 2021-03-15 ENCOUNTER — APPOINTMENT (OUTPATIENT)
Dept: RADIOLOGY | Age: 66
End: 2021-03-15
Attending: INTERNAL MEDICINE
Payer: COMMERCIAL

## 2021-03-15 ENCOUNTER — HOSPITAL ENCOUNTER (OUTPATIENT)
Facility: CLINIC | Age: 66
Discharge: HOME | End: 2021-03-15
Attending: INTERNAL MEDICINE
Payer: COMMERCIAL

## 2021-03-15 VITALS
SYSTOLIC BLOOD PRESSURE: 110 MMHG | TEMPERATURE: 98.8 F | HEART RATE: 95 BPM | OXYGEN SATURATION: 97 % | DIASTOLIC BLOOD PRESSURE: 74 MMHG

## 2021-03-15 DIAGNOSIS — M25.561 POSTERIOR RIGHT KNEE PAIN: Primary | ICD-10-CM

## 2021-03-15 PROCEDURE — 73564 X-RAY EXAM KNEE 4 OR MORE: CPT | Mod: RT | Performed by: INTERNAL MEDICINE

## 2021-03-15 PROCEDURE — 99203 OFFICE O/P NEW LOW 30 MIN: CPT | Performed by: INTERNAL MEDICINE

## 2021-03-15 PROCEDURE — S9083 URGENT CARE CENTER GLOBAL: HCPCS | Performed by: INTERNAL MEDICINE

## 2021-03-15 ASSESSMENT — ENCOUNTER SYMPTOMS
FEVER: 0
SHORTNESS OF BREATH: 0
BACK PAIN: 1

## 2021-03-15 NOTE — ED PROVIDER NOTES
History  Chief Complaint   Patient presents with   • Leg Problem     started a week ago- right leg- putting pressure while walking and feeling shooting sharp pain right behind the right knee radiating upper right thigh     Walking along flat surface  Came down on R foot with intense pain from right thigh to below the knee    Knee has been buckling    No recent illness  Hx arthritis in knees    No recent surgery or immobilization.  No hormones  Non-smoker.  No hx blood clots      History provided by:  Patient   used: No    Knee Pain  Location:  Knee  Time since incident:  6 days  Knee location:  R knee  Pain details:     Onset quality:  Sudden    Duration:  6 days  Chronicity:  New  Relieved by:  Nothing  Worsened by:  Bearing weight (going up steps as well as down but not as bad)  Ineffective treatments:  NSAIDs  Associated symptoms: back pain    Associated symptoms: no fever and no swelling    Risk factors: no recent illness        Past Medical History:   Diagnosis Date   • Endometriosis    • Hypothyroidism        Past Surgical History:   Procedure Laterality Date   • DIAGNOSTIC LAPAROSCOPY         Family History   Problem Relation Age of Onset   • Heart failure Biological Mother 83        CHF   • Dementia Biological Mother    • Cancer Biological Father 70        mesothelioma       Social History     Tobacco Use   • Smoking status: Never Smoker   • Smokeless tobacco: Never Used   Substance Use Topics   • Alcohol use: Yes     Alcohol/week: 2.0 - 3.0 standard drinks     Types: 2 - 3 Glasses of wine per week   • Drug use: No       Review of Systems   Constitutional: Negative for fever.   Respiratory: Negative for shortness of breath.    Cardiovascular: Negative for chest pain and leg swelling.   Musculoskeletal: Positive for back pain.   Skin: Negative for rash.   Psychiatric/Behavioral:        Anxiety       Physical Exam  ED Triage Vitals [03/15/21 1355]   Temp Heart Rate Resp BP SpO2   37.1 °C  (98.8 °F) 95 -- 110/74 97 %      Temp Source Heart Rate Source Patient Position BP Location FiO2 (%) (Set)   Oral Monitor -- Left upper arm --       Physical Exam  Constitutional:       General: She is not in acute distress.     Appearance: She is not toxic-appearing or diaphoretic.   Neck:      Musculoskeletal: No muscular tenderness.   Cardiovascular:      Rate and Rhythm: Normal rate and regular rhythm.   Pulmonary:      Effort: Pulmonary effort is normal. No respiratory distress.      Breath sounds: No wheezing or rales.   Musculoskeletal:         General: No swelling, deformity or signs of injury.      Comments: There is no reproducible tenderness to palpation over the thigh popliteal fossa or calf.  No palpable cords noted.  Can fully extend at the knee.  Has pain in the posterior aspect of the knee with attempts to stand.  There is no obvious varus or valgus instability.  Some tenderness in the lateral knee compartment with straining of the lateral meniscus.  There is a negative anterior drawer sign.  Skin warm and dry.  No appreciable effusion.  Minimal prepatellar crepitus.  No quadriceps defect.  The joint is not warm.   Lymphadenopathy:      Cervical: No cervical adenopathy.   Skin:     General: Skin is warm and dry.      Findings: No erythema or rash.   Neurological:      Mental Status: She is alert and oriented to person, place, and time.   Psychiatric:         Mood and Affect: Mood normal.         Behavior: Behavior normal.           Procedures  Procedures    UC Course  Clinical Impressions as of Mar 15 1506   Posterior right knee pain       MDM  Number of Diagnoses or Management Options  Posterior right knee pain:   Diagnosis management comments: X-rays reviewed.  Ongoing have the patient follow-up with orthopedics on Thursday.  Please see disposition for full care plan.       Amount and/or Complexity of Data Reviewed  Tests in the radiology section of CPT®: ordered and reviewed                    Laith Elizabeth,   03/15/21 1503

## 2021-03-15 NOTE — DISCHARGE INSTRUCTIONS
In the office x-rays were performed which showed no evidence of acute fracture or dislocation.  This is a preliminary reading only.  The radiologist will review the films and I will call you with any significant variance.  I have made you an appointment to see Dr. Harris and his [place] office on Thursday at 1:50 PM.  It is located in the corner building of the [facility].  Please use the provided brace and bring that with you to your doctor's appointment.  In times of pain you can try ice and 20-minute intervals as this may provide some relief for you.

## 2021-03-29 ENCOUNTER — TELEMEDICINE (OUTPATIENT)
Dept: FAMILY MEDICINE | Facility: CLINIC | Age: 66
End: 2021-03-29
Payer: COMMERCIAL

## 2021-03-29 DIAGNOSIS — E03.9 ACQUIRED HYPOTHYROIDISM: Chronic | ICD-10-CM

## 2021-03-29 DIAGNOSIS — Z13.220 SCREENING FOR LIPID DISORDERS: ICD-10-CM

## 2021-03-29 DIAGNOSIS — R10.84 GENERALIZED ABDOMINAL PAIN: Primary | ICD-10-CM

## 2021-03-29 DIAGNOSIS — K57.92 DIVERTICULITIS: ICD-10-CM

## 2021-03-29 DIAGNOSIS — F41.1 GAD (GENERALIZED ANXIETY DISORDER): ICD-10-CM

## 2021-03-29 PROBLEM — J30.9 ALLERGIC RHINITIS: Status: ACTIVE | Noted: 2019-08-19

## 2021-03-29 PROCEDURE — 99215 OFFICE O/P EST HI 40 MIN: CPT | Mod: 95 | Performed by: FAMILY MEDICINE

## 2021-03-29 RX ORDER — SERTRALINE HYDROCHLORIDE 25 MG/1
25 TABLET, FILM COATED ORAL DAILY
Qty: 90 TABLET | Refills: 3 | Status: SHIPPED | OUTPATIENT
Start: 2021-03-29 | End: 2021-11-18

## 2021-03-29 NOTE — PROGRESS NOTES
Verification of Patient Location:  The patient affirms they are currently located in the following state: [place]    Request for Consent:    Video Encounter   Angeli, my name is Nickie Spencer MD.  Before we proceed, can you please verify your identification by telling me your full name and date of birth?  Can you tell me who is in the room with you?    You and I are about to have a telemedicine check-in or visit because you have requested it.  This is a live video-conference.  I am a real person, speaking to you in real time.  There is no one else with me on the video-conference.  However, when we use ([employer], [facility], etc) it is important for you to know that the video-conference may not be secure or private.  I am not recording this conversation and I am asking you not to record it.  This telemedicine visit will be billed to your health insurance or you, if you are self-insured.  You understand you will be responsible for any copayments or coinsurances that apply to your telemedicine visit.  Communication platform used for this encounter:  [employer] Video Visit (with Zoom integration)     Before starting our telemedicine visit, I am required to get your consent for this virtual check-in or visit by telemedicine. Do you consent?      Patient Response to Request for Consent:  Yes      Visit Documentation:  Subjective     Patient ID: Sarina Lao is a 66 y.o. female.  1955      HPI  For the past 2 weeks she felt like something triggered stomach symptoms  She has a hx of off and on IBS  She follows a low FODMAP diet  This felt like an attack  She has had bloating, feeling like stomach won't relax  She has had boughts of constipation  That is how it started and she tried to fix it w/ laxatives  She has had on and off diarrhea/constipations  Se is eating a lot of good healthy fruits and veggies  She will get mild pain in lower left abd that comes and goes  A little tender  Doesn't wake her up  She is  getting burping and belching  She has no bright red blood per rectum or black stools  It is causing a lot of anxiety  She feels like she just cannot relax her whole body.  She just found out her sister in [place] has ovarian cancer and is quite ill.  She is trying to grapple with trying to visit her during Covid times    She just tore her meniscus  Taking Advil/aleve/tylenol  Taking nsaids 2 pills at night. She hasn't taken any in 4-5 days      In the last few days taking no meds for her stomach  Last laxative was more than 2 weeks ago     Never had colonoscopy    Has had a lot    The following have been reviewed and updated as appropriate in this visit:  Allergies  Meds  Problems       Review of Systems   All other systems reviewed and are negative.        Assessment/Plan   Diagnoses and all orders for this visit:    Diverticulitis (Primary)  -     FIT; Future    SOCO (generalized anxiety disorder)  Assessment & Plan:  New  Patient under incredible amounts of stress due to personal health issues on her sisters recent diagnosis of ovarian cancer.  Could be manifesting as alternating diarrhea and constipation.  Will get labs.  We will also start Zoloft.  Risk benefits alternatives and possible side effects discussed with patient.  Patient to reach out in 2 weeks with update.  We will meet with patient in 1 month      Orders:  -     sertraline (ZOLOFT) 25 mg tablet; Take 1 tablet (25 mg total) by mouth daily.    Generalized abdominal pain  Assessment & Plan:  Worsening  Patient has history of IBS however for the past few weeks has been a lot worse alternating between diarrhea and constipation.  No red flag symptoms.  We will check labs  We will check FIT kit  If left lower lower quadrant pain continues worsens or becomes positive on consecutive days would treat for diverticulitis.  Patient also referred to Dr. Stevens for future colonoscopy    Orders:  -     CBC and Differential; Future  -     TSH w reflex FT4;  Future  -     sertraline (ZOLOFT) 25 mg tablet; Take 1 tablet (25 mg total) by mouth daily.    Screening for lipid disorders  -     Comprehensive metabolic panel; Future  -     Lipid panel; Future    Acquired hypothyroidism  Assessment & Plan:  Uncertain status  Could be playing a roll in current abd issues   Will get labs        Time Spent:  I spent 40 minutes on this date of service performing the following activities: obtaining history, entering orders, documenting, preparing for visit, providing counseling and education and coordinating care.

## 2021-03-29 NOTE — ASSESSMENT & PLAN NOTE
New  Patient under incredible amounts of stress due to personal health issues on her sisters recent diagnosis of ovarian cancer.  Could be manifesting as alternating diarrhea and constipation.  Will get labs.  We will also start Zoloft.  Risk benefits alternatives and possible side effects discussed with patient.  Patient to reach out in 2 weeks with update.  We will meet with patient in 1 month

## 2021-03-29 NOTE — ASSESSMENT & PLAN NOTE
Worsening  Patient has history of IBS however for the past few weeks has been a lot worse alternating between diarrhea and constipation.  No red flag symptoms.  We will check labs  We will check FIT kit  If left lower lower quadrant pain continues worsens or becomes positive on consecutive days would treat for diverticulitis.  Patient also referred to Dr. Stevens for future colonoscopy

## 2021-04-13 DIAGNOSIS — Z23 ENCOUNTER FOR IMMUNIZATION: ICD-10-CM

## 2021-04-20 ENCOUNTER — OFFICE VISIT (OUTPATIENT)
Dept: FAMILY MEDICINE | Facility: CLINIC | Age: 66
End: 2021-04-20
Payer: COMMERCIAL

## 2021-04-20 VITALS
HEART RATE: 79 BPM | HEIGHT: 64 IN | BODY MASS INDEX: 32.78 KG/M2 | DIASTOLIC BLOOD PRESSURE: 74 MMHG | TEMPERATURE: 98.6 F | WEIGHT: 192 LBS | SYSTOLIC BLOOD PRESSURE: 116 MMHG | OXYGEN SATURATION: 98 %

## 2021-04-20 DIAGNOSIS — M19.90 ARTHRITIS: ICD-10-CM

## 2021-04-20 DIAGNOSIS — R10.84 GENERALIZED ABDOMINAL PAIN: ICD-10-CM

## 2021-04-20 DIAGNOSIS — Z00.00 ROUTINE GENERAL MEDICAL EXAMINATION AT A HEALTH CARE FACILITY: Primary | ICD-10-CM

## 2021-04-20 DIAGNOSIS — E03.9 ACQUIRED HYPOTHYROIDISM: Chronic | ICD-10-CM

## 2021-04-20 PROBLEM — E06.3 HASHIMOTO'S THYROIDITIS: Status: RESOLVED | Noted: 2017-05-19 | Resolved: 2021-04-20

## 2021-04-20 PROCEDURE — 3008F BODY MASS INDEX DOCD: CPT | Performed by: FAMILY MEDICINE

## 2021-04-20 PROCEDURE — 99396 PREV VISIT EST AGE 40-64: CPT | Performed by: FAMILY MEDICINE

## 2021-04-20 PROCEDURE — 99213 OFFICE O/P EST LOW 20 MIN: CPT | Mod: 25 | Performed by: FAMILY MEDICINE

## 2021-04-20 NOTE — ASSESSMENT & PLAN NOTE
Patient working on diet and exercise  Labs encouraged  Referred to GYN  Referred to GI for colonoscopy  Immunizations up-to-date

## 2021-04-20 NOTE — PROGRESS NOTES
Sarina Lao is a 66 y.o. female and is here for a comprehensive physical exam. In addition to health maintenance, the patient reports the following:     Stomach issus: improved but not gone.  Gets burping, indigestion  Gets intermittant pain/fullness in llq  She can't associated w/ eating and or what he is eating  Not diarrhea  Some constipation  Had one episode of really bad pain      Right knee pain  She has pain behind he knee  Has ortho  Got cortisone shot  She has not been able to walk like she used to  She has moved to biking and stretches    She has a lot of stiffness/pain from arthritis  Got worse since we have been sednetary         Patient Active Problem List   Diagnosis   • Arthritis   • Routine general medical examination at a health care facility   • Tinnitus of both ears   • Mixed hyperlipidemia   • Acquired hypothyroidism   • Decreased hearing of both ears   • Class 1 obesity due to excess calories without serious comorbidity with body mass index (BMI) of 33.0 to 33.9 in adult   • Daytime sleepiness   • Benign paroxysmal positional vertigo due to bilateral vestibular disorder   • Situational anxiety   • Irritant contact dermatitis due to other agents   • Allergic rhinitis   • SOCO (generalized anxiety disorder)   • Generalized abdominal pain                Allergies   Allergen Reactions   • No Known Allergies        Current Outpatient Medications on File Prior to Visit   Medication Sig Dispense Refill   • cholecalciferol, vitamin D3, 5,000 unit (125 mcg) capsule Take 5,000 Units by mouth daily.     • sertraline (ZOLOFT) 25 mg tablet Take 1 tablet (25 mg total) by mouth daily. 90 tablet 3   • triamcinolone (KENALOG) 0.025 % cream Apply topically 2 (two) times a day. 30 g 3     No current facility-administered medications on file prior to visit.         Past Medical History:   Diagnosis Date   • Endometriosis    • Hypothyroidism        Past Surgical History:   Procedure Laterality Date   •  DIAGNOSTIC LAPAROSCOPY         Family History   Problem Relation Age of Onset   • Heart failure Biological Mother 83        CHF   • Dementia Biological Mother    • Cancer Biological Father 70        mesothelioma   • Ovarian cancer Biological Sister        Social History     Socioeconomic History   • Marital status:      Spouse name: None   • Number of children: None   • Years of education: None   • Highest education level: None   Occupational History   • None   Tobacco Use   • Smoking status: Never Smoker   • Smokeless tobacco: Never Used   Substance and Sexual Activity   • Alcohol use: Yes     Alcohol/week: 2.0 - 3.0 standard drinks     Types: 2 - 3 Glasses of wine per week   • Drug use: No   • Sexual activity: Yes     Partners: Male   Other Topics Concern   • None   Social History Narrative    Feels safe at home, lives w/ husanbd     Social Determinants of Health     Financial Resource Strain:    • Difficulty of Paying Living Expenses:    Food Insecurity:    • Worried About Running Out of Food in the Last Year:    • Ran Out of Food in the Last Year:    Transportation Needs:    • Lack of Transportation (Medical):    • Lack of Transportation (Non-Medical):    Physical Activity:    • Days of Exercise per Week:    • Minutes of Exercise per Session:    Stress:    • Feeling of Stress :    Social Connections:    • Frequency of Communication with Friends and Family:    • Frequency of Social Gatherings with Friends and Family:    • Attends Yazidism Services:    • Active Member of Clubs or Organizations:    • Attends Club or Organization Meetings:    • Marital Status:    Intimate Partner Violence:    • Fear of Current or Ex-Partner:    • Emotionally Abused:    • Physically Abused:    • Sexually Abused:          Immunization History   Administered Date(s) Administered   • Influenza Vaccine Quadrivalent Preservative Free 3 Yr And Up 10/06/2019, 09/24/2020   • Influenza Vaccine Quadrivalent Preservative Free 6 Mons and  "Up 09/06/2018   • Influenza, Unspecified 09/06/2018   • MMR 05/11/2019   • Sars-cov-2 (Covid-19) Vaccine, Pfizer 02/25/2021, 03/25/2021   • Tdap 10/13/2013   • Zoster 04/14/2017   • Zoster Vaccine Recombinant Adjuvanted (Shingrix) 06/22/2019, 09/01/2019       Review of systems:  All other systems reviewed and negative except as noted in the HPI.     Objective:  Visit Vitals  /74 (BP Location: Right upper arm, Patient Position: Sitting)   Pulse 79   Temp 37 °C (98.6 °F) (Temporal)   Ht 1.62 m (5' 3.78\")   Wt 87.1 kg (192 lb)   SpO2 98%   BMI 33.18 kg/m²       General Appearance:    Alert, cooperative, no distress, appears stated age   Head:    Normocephalic, without obvious abnormality, atraumatic   Eyes:    PERRL, conjunctiva/corneas clear, EOM's intact, fundi     benign, both eyes   Ears:    Normal TM's and external ear canals, both ears   Nose:   Nares normal, septum midline, mucosa normal, no drainage     or     sinus tenderness   Throat:   Lips, mucosa, and tongue normal; teeth and gums normal   Neck:   Supple, symmetrical, trachea midline, no adenopathy;     thyroid:  no enlargement/tenderness/nodules; no carotid    bruit or JVD   Back:     Symmetric, no curvature, ROM normal, no CVA tenderness   Lungs:     Clear to auscultation bilaterally, respirations unlabored   Chest Wall:    No tenderness or deformity   Heart:    Regular rate and rhythm, S1 and S2 normal, no murmur, rub or          gallop   Abdomen:     Soft, non-tender, bowel sounds active all four quadrants,     no masses, no organomegaly   Extremities:   Extremities normal, atraumatic, no cyanosis or edema   Musculoskeletal:  Pulses:   No injury or deformity    2+ and symmetric all extremities   Skin:   Skin color, texture, turgor normal, no rashes or lesions   Lymph nodes:   Cervical, supraclavicular, and axillary nodes normal   Neurologic:    Behavior/  Emotional:   CNII-XII intact, normal strength, sensation and reflexes     throughout    " Appropriate, cooperative       Assessment & Plan:    Problem List Items Addressed This Visit        Medium    Acquired hypothyroidism (Chronic)     A: stable  P: cont current meds  Labs ordered            Unprioritized    Arthritis     Worsening  Talked through many supplements to try including turmeric and glucosamine chondroitin  Encourage patient to remain active  Talk with patient about weight loss.  Suggested weight watchers         Routine general medical examination at a health care facility     Patient working on diet and exercise  Labs encouraged  Referred to GYN  Referred to GI for colonoscopy  Immunizations up-to-date         Generalized abdominal pain - Primary     Stable  No change  Advised to get labs asap  She is due for colonoscopy. Referred to Dr. Machado.she will f/u with them after her May trip to see sister w/ cancer  Encouraged FIT asap( ordered at telemed but not received by pt)                 Body mass index is 33.18 kg/m².  BMI is elevated. Plan: management and follow-up addressed with patient.      Current Outpatient Medications:   •  cholecalciferol, vitamin D3, 5,000 unit (125 mcg) capsule, Take 5,000 Units by mouth daily., Disp: , Rfl:   •  sertraline (ZOLOFT) 25 mg tablet, Take 1 tablet (25 mg total) by mouth daily., Disp: 90 tablet, Rfl: 3  •  triamcinolone (KENALOG) 0.025 % cream, Apply topically 2 (two) times a day., Disp: 30 g, Rfl: 3    Follow up in one year

## 2021-04-20 NOTE — ASSESSMENT & PLAN NOTE
Worsening  Talked through many supplements to try including turmeric and glucosamine chondroitin  Encourage patient to remain active  Talk with patient about weight loss.  Suggested weight watchers

## 2021-04-20 NOTE — ASSESSMENT & PLAN NOTE
Stable  No change  Advised to get labs asap  She is due for colonoscopy. Referred to Dr. Machado.she will f/u with them after her May trip to see sister w/ cancer  Encouraged FIT asap( ordered at telemed but not received by pt)

## 2021-05-22 LAB
ALBUMIN SERPL-MCNC: 4.3 G/DL (ref 3.6–5.1)
ALBUMIN/GLOB SERPL: 1.7 (CALC) (ref 1–2.5)
ALP SERPL-CCNC: 66 U/L (ref 37–153)
ALT SERPL-CCNC: 17 U/L (ref 6–29)
AST SERPL-CCNC: 15 U/L (ref 10–35)
BASOPHILS # BLD AUTO: 61 CELLS/UL (ref 0–200)
BASOPHILS NFR BLD AUTO: 1.2 %
BILIRUB SERPL-MCNC: 0.9 MG/DL (ref 0.2–1.2)
BUN SERPL-MCNC: 9 MG/DL (ref 7–25)
BUN/CREAT SERPL: ABNORMAL (CALC) (ref 6–22)
CALCIUM SERPL-MCNC: 9.6 MG/DL (ref 8.6–10.4)
CHLORIDE SERPL-SCNC: 102 MMOL/L (ref 98–110)
CHOLEST SERPL-MCNC: 241 MG/DL
CHOLEST/HDLC SERPL: 3.1 (CALC)
CO2 SERPL-SCNC: 29 MMOL/L (ref 20–32)
CREAT SERPL-MCNC: 0.71 MG/DL (ref 0.5–0.99)
EOSINOPHIL # BLD AUTO: 143 CELLS/UL (ref 15–500)
EOSINOPHIL NFR BLD AUTO: 2.8 %
ERYTHROCYTE [DISTWIDTH] IN BLOOD BY AUTOMATED COUNT: 13.1 % (ref 11–15)
GLOBULIN SER CALC-MCNC: 2.6 G/DL (CALC) (ref 1.9–3.7)
GLUCOSE SERPL-MCNC: 108 MG/DL (ref 65–99)
HCT VFR BLD AUTO: 41.2 % (ref 35–45)
HDLC SERPL-MCNC: 77 MG/DL
HGB BLD-MCNC: 13.9 G/DL (ref 11.7–15.5)
LDLC SERPL CALC-MCNC: 143 MG/DL (CALC)
LYMPHOCYTES # BLD AUTO: 1505 CELLS/UL (ref 850–3900)
LYMPHOCYTES NFR BLD AUTO: 29.5 %
MCH RBC QN AUTO: 31.7 PG (ref 27–33)
MCHC RBC AUTO-ENTMCNC: 33.7 G/DL (ref 32–36)
MCV RBC AUTO: 93.8 FL (ref 80–100)
MONOCYTES # BLD AUTO: 393 CELLS/UL (ref 200–950)
MONOCYTES NFR BLD AUTO: 7.7 %
NEUTROPHILS # BLD AUTO: 2999 CELLS/UL (ref 1500–7800)
NEUTROPHILS NFR BLD AUTO: 58.8 %
NONHDLC SERPL-MCNC: 164 MG/DL (CALC)
PLATELET # BLD AUTO: 286 THOUSAND/UL (ref 140–400)
PMV BLD REES-ECKER: 11.2 FL (ref 7.5–12.5)
POTASSIUM SERPL-SCNC: 4.4 MMOL/L (ref 3.5–5.3)
PROT SERPL-MCNC: 6.9 G/DL (ref 6.1–8.1)
QUEST EGFR AFRICAN AMERICAN: 104 ML/MIN/1.73M2
QUEST EGFR NON-AFR. AMERICAN: 90 ML/MIN/1.73M2
RBC # BLD AUTO: 4.39 MILLION/UL (ref 3.8–5.1)
SODIUM SERPL-SCNC: 141 MMOL/L (ref 135–146)
TRIGL SERPL-MCNC: 99 MG/DL
TSH SERPL-ACNC: 3.42 MIU/L (ref 0.4–4.5)
WBC # BLD AUTO: 5.1 THOUSAND/UL (ref 3.8–10.8)

## 2021-05-25 NOTE — PROGRESS NOTES
Integrated Behavioral Health Outpatient Initial Visit- In office  Visit Type Performed: In-person    Sarina Lao, a 66 y.o. female, presented today for an initial behavioral health evaluation visit.  Clinician confirmed identification of patient by name and birth date.    Informed Consent/Confidentiality:   Pt was explained the model of primary care behavioral health we provide at [facility], including how the psychologist/licensed behavioral health provider collaborates regularly with the pt’s PCP regarding the pt’s treatment. The integrated behavioral health progress notes are visible to the physicians and advanced practitioners in the practice where the pt is being seen. The visit diagnosis and appointment/scheduling information is visible to the patient's EPIC chart, to provide continuity of care across the [facility] system. The pt will also have access to view their notes via [employer] (pt portal).  This is a low-intensity model of care (we provide 8-10 visits of cognitive-behavioral therapy), and the patient has the right to other options of behavioral health care that are indicated for more severe conditions (ie: Traditional Outpatient psychotherapy, Intensive Outpatient Programs, Partial Hospitalization Programs, and Inpatient services).  Also discussed were confidentiality and the limits of confidentiality (ie: if pt is at imminent risk of suicide, homicide, or reports child abuse/neglect, providers may need to break confidentiality to ensure the safety of the pt or to follow mandated reporting requirements).   Pt expressed understanding and consent: Yes      SUBJECTIVE     Reason for visit:  She presented today regarding wanting to manage a long history of generalized anxiety symptoms      History of Behavioral Health Treatment  Previous treatment: Participated in both individual and couples sessions briefly in the past.     Pt. stated they have no history of psychiatric hospitalizations.     Previously experienced  "symptoms of: no additional concerns noted  Other pertinent behavioral health history: None    Substance Use History  ETOH/alcohol use: Drinks wine/cocktails once in a while; usually has a glass of wine 2-3 nights per week at dinner and maybe a cocktail on the weekend. She stated she always drinks with food and is working to limit her consumption of alcohol so she can lose weight.   Other substance or supplement use: denied., substance use: denied, caffeine: 1-2 cups of caffeine per day.     Social History  Significant relationships/Support Network: Pilar identified her  of 33 years, sister, and best friend of 25 years as part of her support system. She noted that she has had marital problems in the past and stated that her  often jumps into problem-solving mode when she is stressed out. One of Pilar's stressors is her sister's health (diagnosed with ovarian cancer) and she expressed that she doesn't want to talk to her sister about her own problems due to her sister's stressors. She reported having two children (daughter age 28 living in [place] and a son age 26 living in [place]). Pilar identified some stressors with her children in the past.     Cultural Practices Latter day/Spiritual Beliefs pertinent to treatment: Pilar identified as spiritual and stated that she grew up Adventism.   Additional pertinent historical information includes:  college graduate; Pilar reported that she is not currently employed. She noted that she has always worked except for a 10-year period when she was raising her children. 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     Symptoms  Depression symptoms: PHQ 9:  Little Interest or Pleasure in Doing Things: 0-->not at all    Feeling Down, " Depressed or Hopeless: 0-->not at all    Trouble Falling or Staying Asleep, or Sleeping Too Much: 3-->nearly every day    Feeling Tired or Having Little Energy: 2-->more than half the days    Poor Appetite or Overeatin-->several days    Feeling Bad about Yourself - or that You are a Failure or Have Let Yourself or Your Family Down: 0-->not at all    Trouble Concentrating on Things, Such as Reading the Newspaper or Watching Television: 0-->not at all    Moving or Speaking So Slowly that Other People Could Have Noticed? Or the Opposite - Being So Fidgety: 1-->several days    Thoughts that You Would be Better Off Dead or of Hurting Yourself in Some Way: 0-->not at all    PHQ-9: Brief Depression Severity Measure Score: 7    If You Checked Off Any Problems, How Difficult Have These Problems Made It For You to Do Your Work, Take Care of Things at Home, or Get Along with Other People?: somewhat difficult    Anxiety symptoms: SOCO-7  Feeling nervous, anxious or on edge: 2-->More than half the days    Not being able to stop or control worryin-->More than half the days    Worrying too much about different things: 2-->More than half the days    Trouble relaxing: 3-->Nearly every day    Being so restless that it is hard to sit still: 1-->Several days    Becoming easily annoyed or irritable: 0-->Not at all    Feeling afraid as if something awful might happen: 0-->Not at all      GAD7 Total Score: : 10      If you checked off any problems, how difficult have these made it for you to do your work, take care of things at home, or get along with other people?: Not difficult at all    Additional reported symptoms: None      OBJECTIVE     Mental Status Exam  Appearance: Well Groomed  Speech: Regular  Psychomotor Functioning: WNL  Eye Contact: WNL  Orientation: Fully oriented  Attention: WNL  Concentration: WNL  Recent Memory: WNL  Remote Memory: WNL  Thought Content: Appropriate  Thought Process: WNL  Affect: Full Range  Mood:  "Anxious      ASSESSMENT     Psychotropic medications: N/a; doesn't take the zoloft   Current Outpatient Medications   Medication Sig Dispense Refill   • cholecalciferol, vitamin D3, 5,000 unit (125 mcg) capsule Take 5,000 Units by mouth daily.     • sertraline (ZOLOFT) 25 mg tablet Take 1 tablet (25 mg total) by mouth daily. 90 tablet 3   • triamcinolone (KENALOG) 0.025 % cream Apply topically 2 (two) times a day. 30 g 3     No current facility-administered medications for this visit.         Suicidal Ideation/Homicidal Ideation Risk Assessment  Risk Factors: Isolation or feeling isolated   Protective factors: Effective and accessible mental health care , Connectedness to individuals, family, community, and social institutions and Problem-solving and conflict resolution skills    Suicidal Ideation: Passive Ideation, No intention or plan., No specific plan to harm self. Pilar stated that she sometimes has thoughts such as \"Is it worthwhile?\" She denied plan/intent to act on these passive thoughts.   Self Injurious Behavior:  Not Present  Homicidal Behavior: Not Present  Estimate of Current Risk: Minimal risk    Plan for Safety-   If pt develops passive SI or HI, they agreed to spend time outside, garden, work on decorating her home, reaching out to her children and family. If pt develops active SI or HI (for example, SI with a plan or intent), they agreed to call the National Suicide Prevention Lifeline (1-690.933.4812) or text HOME to 657585, call 813 or go to the nearest ED or crisis center for an emergency psychiatric evaluation.         Screening measures administered during this visit  PHQ-9: Brief Depression Severity Measure Score: 7  GAD7 Total Score: : 10      ASSESSMENT / IMPRESSIONS  Sarina Lao seems to be experiencing a history of generalized anxiety symptoms, exacerbated by her sister's cancer diagnosis, being out of work, and aging. She endorsed many \"what if\" thoughts and identified herself as a " worrier. She noted that her anxiety interferes with her sleep and she experiences racing thoughts. She appears to meet criteria for Generalized Anxiety Disorder and CBT will likely be beneficial in reducing her anxious symptoms.       Severity: moderate, chronicity: chronic, duration:years  Associated factors include sister's health; aging; not working  Prognostic protective factors include, openness to treatment and good support system. Prognostic risk factors include, chronic nature of anxiety.      PLAN     Goals     •  To learn how to manage anxiety better (pt-stated)       Improve sleep             Psychoeducation/interventions provided  Cognitive behavioral therapy  Cognitive distortions    Introduced and explained the cognitive model and the relationship between thoughts, feelings, and behaviors. Began to identify and challenge unhelpful thoughts. Introduced cognitive distortions and we worked to identify which ones are most frequently used by pt. Began to work to reframe cognitive distortions in session.       Recommendations for treatment: Individual Therapy, 2 times monthly    Recommendations for Interventions:Acceptance & Adjustment  Anxiety Reduction Techniques  Cognitive Behavior Training  Cognitive Skills Training      Next visit plan  Discuss sleep hygiene   Follow up on cognitive distortions

## 2021-05-27 ENCOUNTER — OFFICE VISIT (OUTPATIENT)
Dept: FAMILY MEDICINE | Facility: CLINIC | Age: 66
End: 2021-05-27
Payer: COMMERCIAL

## 2021-05-27 DIAGNOSIS — F41.1 GAD (GENERALIZED ANXIETY DISORDER): Primary | ICD-10-CM

## 2021-05-27 ASSESSMENT — COGNITIVE AND FUNCTIONAL STATUS - GENERAL
APPEARANCE: WELL GROOMED
PSYCHOMOTOR FUNCTIONING: WNL
THOUGHT_PROCESS: WNL
ATTENTION: WNL
REMOTE MEMORY: WNL
SPEECH: REGULAR
CONCENTRATION: WNL
AFFECT: FULL RANGE
ORIENTATION: FULLY ORIENTED
RECENT MEMORY: WNL
AROUSAL LEVEL: ALERT
EYE_CONTACT: WNL
THOUGHT_CONTENT: APPROPRIATE
MOOD: ANXIOUS

## 2021-05-27 NOTE — PATIENT INSTRUCTIONS
- Reflect on the relationship between thoughts, feelings, and behavior   - Review handout on thinking errors and start to notice when you're using them     Plan for Safety-   If pt develops passive SI or HI, they agreed to spend time outside, garden, work on decorating her home, reaching out to her children and family. If pt develops active SI or HI (for example, SI with a plan or intent), they agreed to call the National Suicide Prevention Lifeline (1-318.923.1714) or text HOME to 089845, call 514 or go to the nearest ED or crisis center for an emergency psychiatric evaluation.

## 2021-07-13 NOTE — PROGRESS NOTES
"Integrated Behavioral Health Follow-up Office Visit Note  Visit Type Performed: in-person (in office)  Visit number: 2     Duration: 30 min  Sarina Lao 1955 a 66 y.o.female, who presented today for a behavioral health visit.    SUBJECTIVE     Reason for visit: Learn to manage anxiety       Symptoms  Depression symptoms: depressed mood and hopelessness  Anxiety symptoms: Excessive anxiety/ worry- yes and Difficulty controlling worry- yes  Additional reported symptoms: None       OBJECTIVE     Mental Status Evaluation  Patient's mood and affect were consistent with the context, and consistent with their baseline: Yes   Comments:  depressed and anxious, alert,oriented, in NAD with a full range of affect, normal behavior and no psychotic features    Suicidal Ideation/Homicidal Ideation Risk Assessment: not assessed. If not assessed, reason:  Passive SI such as \"Is it worthwhile?\" reported at intake with no specific plan/intent, risk was assessed to be minimal at intake. Pilar did not report any SI today and had future orientation such as wanting to plan time with her friend. She has significant protective factors.   Risk Factors: Isolation or feeling isolated   Protective factors: Effective and accessible mental health care , Connectedness to individuals, family, community, and social institutions and Problem-solving and conflict resolution skills    Estimate of Current Risk: Minimal risk    Plan for Safety developed 5/27/21-   If pt develops passive SI or HI, they agreed to spend time outside, garden, work on decorating her home, reaching out to her children and family. If pt develops active SI or HI (for example, SI with a plan or intent), they agreed to call the National Suicide Prevention Lifeline (1-179.698.3154) or text HOME to 880198, call 524 or go to the nearest ED or crisis center for an emergency psychiatric evaluation.          Interventions  Anxiety Reduction Techniques  Cognitive Behavior " "Training  We discussed Pilar's knee pain and her concern about agism, and feeling lost. Explored her use of \"shoulds\" and normalized the difficulty in changing her thought process. Instilled hope that a shift in mindset can be made. Introduced and discussed the difference between demand-based language (e.g., have to, need to, should, etc.) and preference-based language (e.g., would like to, might be helpful to, want to, etc.) and encouraged a shift to preference-based language to improve mood.       Psychotropic medications: not assessed   Current Outpatient Medications   Medication Sig Dispense Refill   • cholecalciferol, vitamin D3, 5,000 unit (125 mcg) capsule Take 5,000 Units by mouth daily.     • sertraline (ZOLOFT) 25 mg tablet Take 1 tablet (25 mg total) by mouth daily. 90 tablet 3   • triamcinolone (KENALOG) 0.025 % cream Apply topically 2 (two) times a day. 30 g 3     No current facility-administered medications for this visit.       ASSESSMENT       Progress  Patient's progress toward their goals is generally showing no change. Patient's symptomology is waxing and waning (Pilar reported that she is struggling with reframing her thoughts and letting go of \"shoulds.\" She appeared hesitant about the effectiveness of using preference-based language but was willing to try it.)       PLAN     Goals     •  To learn how to manage anxiety better (pt-stated)       Improve sleep             Psychoeducation provided on  (see patient instructions)       Recommendations  Individual Therapy  30 minutes2 times monthly    Next visit plan:  Review thought record   Follow up on use of preference-based language  Discuss sleep hygiene  "

## 2021-07-15 ENCOUNTER — OFFICE VISIT (OUTPATIENT)
Dept: FAMILY MEDICINE | Facility: CLINIC | Age: 66
End: 2021-07-15
Payer: COMMERCIAL

## 2021-07-15 DIAGNOSIS — F41.1 GENERALIZED ANXIETY DISORDER: Primary | ICD-10-CM

## 2021-07-15 NOTE — PATIENT INSTRUCTIONS
- Practice shifting from demand-based language to preference-based language   - Try to schedule more time with your friend

## 2021-09-08 NOTE — PROGRESS NOTES
Integrated Behavioral Health Follow-up Office Visit Note  Visit Type Performed: in-person (in office)  Visit number: 3     Duration: 37 min  Sarina Lao 1955 a 66 y.o.female, who presented today for a behavioral health visit.    SUBJECTIVE     Reason for visit: Manage anxiety symptoms       Symptoms  Depression symptoms: None reported   Anxiety symptoms: Excessive anxiety/ worry- yes and Difficulty controlling worry- yes, although reported to be less in intensity compared to previous visits       OBJECTIVE     Mental Status Evaluation  Patient's mood and affect were consistent with the context, and consistent with their baseline: Yes   Comments:  calm and pleasant, alert,oriented, in NAD with a full range of affect, normal behavior and no psychotic features      Suicidal Ideation/Homicidal Ideation Risk Assessment: assessed. If not assessed, reason:   Risk Factors: Isolation or feeling isolated   Protective factors: Effective and accessible mental health care , Connectedness to individuals, family, community, and social institutions and Problem-solving and conflict resolution skills    Suicidal Ideation: Not Present      Estimate of Current Risk: Minimal risk     Plan for Safety developed 5/27/21-   If pt develops passive SI or HI, they agreed to spend time outside, garden, work on decorating her home, reaching out to her children and family. If pt develops active SI or HI (for example, SI with a plan or intent), they agreed to call the National Suicide Prevention Lifeline (1-573.260.7876) or text HOME to 694080, call 527 or go to the nearest ED or crisis center for an emergency psychiatric evaluation.       Interventions  Acceptance & Adjustment  Anxiety Reduction Techniques  Cognitive Behavior Training  We discussed Mei's life stressors, including her knee issues, unpredictability with COVID, a potential move to a different state for skilled nursing, and wanting to travel but feeling constrained with COVID.  Reviewed the relationship between thoughts, feelings, and behavior and identified cognitive distortions that likely increase Mei's anxiety. Guided her to think about things from a different perspective. Introduced and discussed radical acceptance as a coping skill (accepting things as they are/accepting things we cannot change). Reviewed use of preference-based language.       Psychotropic medications: Mei reported that she has not started the Zoloft and would prefer to not take the Zoloft. We discussed that she seems to be managing her symptoms well with CBT strategies.   Current Outpatient Medications   Medication Sig Dispense Refill   • cholecalciferol, vitamin D3, 5,000 unit (125 mcg) capsule Take 5,000 Units by mouth daily.     • sertraline (ZOLOFT) 25 mg tablet Take 1 tablet (25 mg total) by mouth daily. 90 tablet 3   • triamcinolone (KENALOG) 0.025 % cream Apply topically 2 (two) times a day. 30 g 3     No current facility-administered medications for this visit.       ASSESSMENT       Progress  Patient's progress toward their goals is generally improving. Patient's symptomology is gradually improving (Mei reported that she feels she is making a lot of progress. She stated that she has been able to challenge catastrphic and black and white thinking, which has helped decrease her anxiety).         PLAN     Goals     •  To learn how to manage anxiety better (pt-stated)       Improve sleep             Psychoeducation provided on  (see patient instructions)      Recommendations  Individual Therapy  30 minutes2 times monthly    Next visit plan:  Follow up on homework  Follow up on how to deal with other people's negativity   Follow up on decision about where to move   Discuss sleep hygiene

## 2021-09-09 ENCOUNTER — OFFICE VISIT (OUTPATIENT)
Dept: FAMILY MEDICINE | Facility: CLINIC | Age: 66
End: 2021-09-09
Payer: COMMERCIAL

## 2021-09-09 DIAGNOSIS — F41.1 GENERALIZED ANXIETY DISORDER: Primary | ICD-10-CM

## 2021-09-09 NOTE — PATIENT INSTRUCTIONS
"- Continue to challenge catastrophizing and black and white thinking  - Continue to practice shifting from demand-based language to preference-based language   - Review radical acceptance handout  - When you get stuck on anger, ask yourself \"How does this serve me?\" and \"What am I gaining from this?\"  "

## 2021-10-11 NOTE — PROGRESS NOTES
"Integrated Behavioral Health Follow-up Office Visit Note  Visit Type Performed: in-person (in office)  Visit number: 4     Duration: 35 min  Sarina Lao 1955 a 66 y.o.female, who presented today for a behavioral health visit.    SUBJECTIVE     Reason for visit: Learn strategies to manage anxiety       Symptoms  Depression symptoms: depressed mood and hopelessness  Anxiety symptoms: Excessive anxiety/ worry- yes and Difficulty controlling worry- yes      OBJECTIVE     Mental Status Evaluation  Patient's mood and affect were consistent with the context, and consistent with their baseline: No - Mei appeared more depressed and tearful compared to previous visits   Comments:  depressed, awake and alert; oriented to person, place, and time    Suicidal Ideation/Homicidal Ideation Risk Assessment: assessed. If not assessed, reason:   Risk Factors: Isolation or feeling isolated; physical health   Protective factors: Effective and accessible mental health care , Connectedness to individuals, family, community, and social institutions and Problem-solving and conflict resolution skills     Suicidal Ideation: Mei stated that, because of her knee issues, she has questioned, \"is life worth it for me?\" but did not indicate any plan or intent to act on those thoughts. She stated \"of course I want to be here\" and expressed future orientation about wanting to watch her children to continue to evolve as people.      Estimate of Current Risk: Minimal risk     Plan for Safety developed 5/27/21-   If pt develops passive SI or HI, they agreed to spend time outside, garden, work on decorating her home, reaching out to her children and family. If pt develops active SI or HI (for example, SI with a plan or intent), they agreed to call the National Suicide Prevention Lifeline (1-111.725.9245) or text HOME to 272694, call 878 or go to the nearest ED or crisis center for an emergency psychiatric " evaluation.       Interventions  Acceptance & Adjustment  Anxiety Reduction Techniques  Cognitive Behavior Training  Communication Skills development  We discussed Mei's recent visit with her daughter and processed her emotions about this visit. Practiced reframing thoughts throughout the visit. Reviewed use of radical acceptance as a coping skills and discussed journaling as a form of emotional expression. Introduced and explained I-statements as a form of assertive communication and modeled I-statements in session.      Psychotropic medications: Not assessed   Current Outpatient Medications   Medication Sig Dispense Refill   • cholecalciferol, vitamin D3, 5,000 unit (125 mcg) capsule Take 5,000 Units by mouth daily.     • sertraline (ZOLOFT) 25 mg tablet Take 1 tablet (25 mg total) by mouth daily. 90 tablet 3   • triamcinolone (KENALOG) 0.025 % cream Apply topically 2 (two) times a day. 30 g 3     No current facility-administered medications for this visit.       ASSESSMENT       Progress  Patient's progress toward their goals is generally improving. Patient's symptomology is waxing and waning but better overall (Mei stated that she is working on reframing thoughts and letting go of things that are not in her control. She endorsed some depressive and anxious symptoms, and appeared open to interventions suggested during today's visit)       PLAN     Goals     •  To learn how to manage anxiety better (pt-stated)       Improve sleep             Psychoeducation provided on  (see patient instructions)      Recommendations  Individual Therapy  30 minutes2 times monthly    Next visit plan:  Follow up on homework  Explore grief related to her knee   Assess sleep (part of treatment goal is to improve sleep)

## 2021-10-14 ENCOUNTER — OFFICE VISIT (OUTPATIENT)
Dept: FAMILY MEDICINE | Facility: CLINIC | Age: 66
End: 2021-10-14
Payer: COMMERCIAL

## 2021-10-14 DIAGNOSIS — F41.1 GENERALIZED ANXIETY DISORDER: Primary | ICD-10-CM

## 2021-10-14 NOTE — PATIENT INSTRUCTIONS
- Continue to reflect on the shift when children become adults   - Continue to practice radical acceptance   - consider journaling as a way to express your emotions and process them

## 2021-11-09 ENCOUNTER — TELEPHONE (OUTPATIENT)
Dept: FAMILY MEDICINE | Facility: CLINIC | Age: 66
End: 2021-11-09

## 2021-11-09 NOTE — TELEPHONE ENCOUNTER
Please advise her best bet would be to call her insurance and see who is covered by her insurance.  Many of the GI offices are trying to catch up from Covid times in getting their folks in for colonoscopies.  If she gets the name of a couple docs that are covered by her insurance and would like to run them by me I had be happy to give my input

## 2021-11-09 NOTE — TELEPHONE ENCOUNTER
Patient wanted to know if there was another office  could recommend for a GI doctor. They tried to schedule with [facility] however all of their providers are scheduling into February. Please advise.

## 2021-11-11 ENCOUNTER — OFFICE VISIT (OUTPATIENT)
Dept: FAMILY MEDICINE | Facility: CLINIC | Age: 66
End: 2021-11-11
Payer: COMMERCIAL

## 2021-11-11 VITALS
OXYGEN SATURATION: 99 % | HEIGHT: 63 IN | HEART RATE: 98 BPM | BODY MASS INDEX: 32.43 KG/M2 | DIASTOLIC BLOOD PRESSURE: 78 MMHG | SYSTOLIC BLOOD PRESSURE: 122 MMHG | WEIGHT: 183 LBS | TEMPERATURE: 98.7 F

## 2021-11-11 DIAGNOSIS — R10.84 GENERALIZED ABDOMINAL PAIN: Primary | ICD-10-CM

## 2021-11-11 DIAGNOSIS — R14.0 ABDOMINAL DISTENSION: ICD-10-CM

## 2021-11-11 PROCEDURE — 99213 OFFICE O/P EST LOW 20 MIN: CPT | Performed by: FAMILY MEDICINE

## 2021-11-11 PROCEDURE — 3008F BODY MASS INDEX DOCD: CPT | Performed by: FAMILY MEDICINE

## 2021-11-11 NOTE — PROGRESS NOTES
"      Subjective      Patient ID: Sarina Lao is a 66 y.o. female.  1955      HPI  After last ov abd issues got better  W/i the last month she is getting lower left pain again, she is getting constipated.  Can't put her finger on what causes it  She get bloating and belching  No blood in stool    She tried calling Dr. Stevens's office and no appt until 2/22  Got an appointment w/ Dr. Grant 12/21    She is more worried this time b/c the sx have stayed for over a week and usually it would come and go    She takes mom when she really need to \"go\"  She has a lot of issues w/ fiber giving her gas    No fever  No blood in stool  No n/v/d  No weight loss  No early satiety     The following have been reviewed and updated as appropriate in this visit:  Tobacco  Allergies  Meds  Med Hx  Surg Hx  Fam Hx  Soc Hx      Review of Systems   All other systems reviewed and are negative.      Objective     Vitals:    11/11/21 1502   BP: 122/78   BP Location: Right upper arm   Patient Position: Sitting   Pulse: 98   Temp: 37.1 °C (98.7 °F)   TempSrc: Temporal   SpO2: 99%   Weight: 83 kg (183 lb)   Height: 1.6 m (5' 3\")     Body mass index is 32.42 kg/m².    Physical Exam  Vitals and nursing note reviewed.   Constitutional:       Appearance: Normal appearance.   Cardiovascular:      Rate and Rhythm: Normal rate and regular rhythm.      Heart sounds: Normal heart sounds.   Pulmonary:      Effort: Pulmonary effort is normal.      Breath sounds: Normal breath sounds.   Abdominal:      General: Abdomen is flat. Bowel sounds are normal. There is no distension.      Palpations: Abdomen is soft. There is no mass.      Tenderness: There is abdominal tenderness (min llq). There is no guarding or rebound.      Hernia: No hernia is present.   Musculoskeletal:         General: No swelling.      Cervical back: Normal range of motion and neck supple.   Skin:     General: Skin is warm and dry.   Neurological:      Mental Status: She is " alert.   Psychiatric:         Mood and Affect: Mood normal.         Behavior: Behavior normal.         Thought Content: Thought content normal.         Judgment: Judgment normal.         Assessment/Plan   Diagnoses and all orders for this visit:    Abdominal distension (Primary)  -     CT ABDOMEN PELVIS WITH AND WITHOUT IV CONTRAST; Future  -     Basic metabolic panel; Future    Generalized abdominal pain  Assessment & Plan:  Worsening  Concerned about low-lying diverticulitis  We will get a CT scan  If symptoms change or get worse patient to reach out office

## 2021-11-13 NOTE — ASSESSMENT & PLAN NOTE
Worsening  Concerned about low-lying diverticulitis  We will get a CT scan  If symptoms change or get worse patient to reach out office

## 2021-11-15 ENCOUNTER — TELEPHONE (OUTPATIENT)
Dept: FAMILY MEDICINE | Facility: CLINIC | Age: 66
End: 2021-11-15

## 2021-11-15 ENCOUNTER — APPOINTMENT (OUTPATIENT)
Dept: LAB | Facility: CLINIC | Age: 66
End: 2021-11-15
Attending: FAMILY MEDICINE
Payer: COMMERCIAL

## 2021-11-15 DIAGNOSIS — R14.0 ABDOMINAL DISTENSION (GASEOUS): ICD-10-CM

## 2021-11-15 PROCEDURE — 30099997 SPECIMEN PROCESSING

## 2021-11-15 NOTE — TELEPHONE ENCOUNTER
Spoke with patient and advised that clinicals were sent into insurance and we are waiting for response. She did schedule for Friday and was advised if we don't hear anything back by Wednesday I would be in contact with insurance

## 2021-11-15 NOTE — TELEPHONE ENCOUNTER
Received message on reception line, patient wanting to know any updates regarding their prior auth for their CT scan. They stated their symptoms are not getting any better. Please advise.

## 2021-11-15 NOTE — TELEPHONE ENCOUNTER
Patient calling back to inform that she is getting the CT scan in [facility] ([facility]) NPI # [id number]  also patient was able to schedule her labs through [employer] but the schedule date is Wednesday November 17th and she was like to know if that is cutting close for the CT that she is having done on Friday, November 19th. Please advise

## 2021-11-16 LAB
QUEST COMMENT: NORMAL
QUEST CONTAINER TYPE:: NORMAL
QUEST QUESTION/PROBLEM: NORMAL
SERVICE CMNT 03-IMP: NORMAL

## 2021-11-18 NOTE — TELEPHONE ENCOUNTER
fax came back stating CT without and with contrast has been denied. - a detailed study with pictures taken only using a dye is more likely to show your Dr all they need to see. This study will be approved if ordered.     Started PA for CT with contrast- sent in clinicals and waiting for decision to come back.

## 2021-11-18 NOTE — TELEPHONE ENCOUNTER
Spoke with patient,  and insurance company to verify that their CT scan is verified and able to state [facility] even though patient is going to [facility]. Have also verified information with Brittany as well as with a manager at [facility]. No further action needed at this time.

## 2021-11-18 NOTE — TELEPHONE ENCOUNTER
Patient and  contacted office stating they received approval from insurance. Authorization number : [id number]. Valid:11/181/21-2/16/22. Patient states they were having issues with insurance and NPI. Our NPI for imagining here is the same as perlita and that's why it was given. If any further issues they were advised to contact office.

## 2021-11-19 ENCOUNTER — HOSPITAL ENCOUNTER (OUTPATIENT)
Dept: RADIOLOGY | Age: 66
Discharge: HOME | End: 2021-11-19
Attending: FAMILY MEDICINE
Payer: COMMERCIAL

## 2021-11-19 ENCOUNTER — HOSPITAL ENCOUNTER (OUTPATIENT)
Dept: RADIOLOGY | Age: 66
End: 2021-11-19
Attending: FAMILY MEDICINE
Payer: COMMERCIAL

## 2021-11-19 DIAGNOSIS — R10.84 GENERALIZED ABDOMINAL PAIN: ICD-10-CM

## 2021-11-19 DIAGNOSIS — R14.0 ABDOMINAL DISTENSION: ICD-10-CM

## 2021-11-19 PROCEDURE — 63600105 HC IODINE BASED CONTRAST: Performed by: FAMILY MEDICINE

## 2021-11-19 PROCEDURE — 25500000 HC DRUGS/INCIDENT RAD: Performed by: FAMILY MEDICINE

## 2021-11-19 PROCEDURE — 74177 CT ABD & PELVIS W/CONTRAST: CPT | Mod: MG

## 2021-11-19 RX ADMIN — BARIUM SULFATE 900 ML: 21 SUSPENSION ORAL at 10:30

## 2021-11-19 RX ADMIN — IOHEXOL 100 ML: 350 INJECTION, SOLUTION INTRAVENOUS at 11:40

## 2021-11-21 LAB
BUN SERPL-MCNC: 13 MG/DL (ref 7–25)
BUN/CREAT SERPL: NORMAL (CALC) (ref 6–22)
CALCIUM SERPL-MCNC: 10.1 MG/DL (ref 8.6–10.4)
CHLORIDE SERPL-SCNC: 103 MMOL/L (ref 98–110)
CO2 SERPL-SCNC: 28 MMOL/L (ref 20–32)
CREAT SERPL-MCNC: 0.77 MG/DL (ref 0.5–0.99)
GLUCOSE SERPL-MCNC: 96 MG/DL (ref 65–99)
POTASSIUM SERPL-SCNC: 4.1 MMOL/L (ref 3.5–5.3)
QUEST EGFR AFRICAN AMERICAN: 95 ML/MIN/1.73M2
QUEST EGFR NON-AFR. AMERICAN: 82 ML/MIN/1.73M2
SODIUM SERPL-SCNC: 141 MMOL/L (ref 135–146)

## 2021-12-02 ENCOUNTER — OFFICE VISIT (OUTPATIENT)
Dept: FAMILY MEDICINE | Facility: CLINIC | Age: 66
End: 2021-12-02
Payer: COMMERCIAL

## 2021-12-02 DIAGNOSIS — F41.1 GENERALIZED ANXIETY DISORDER: Primary | ICD-10-CM

## 2021-12-02 NOTE — PATIENT INSTRUCTIONS
- Work on increasing social interaction  - Check out [employer] to find group activities   - Consider volunteering at places other than [employer] (e.g., food bank, animal shelter, etc.)  - Consider trying a class as a way to meet people (e.g., pottery, cooking, etc.)   - Career coaching: https://www.[employer]/page/career-coaches/b.2015.g.76791.html?page=1

## 2021-12-02 NOTE — PROGRESS NOTES
Integrated Behavioral Health Follow-up Office Visit Note  Visit Type Performed: in-person (in office)  Visit number: 5     Duration: 30 min  Sarina Lao 1955 a 66 y.o.female, who presented today for a behavioral health visit.    SUBJECTIVE     Reason for visit: Learn strategies to manage anxiety       Symptoms  Depression symptoms: depressed mood, anhedonia and hopelessness  Anxiety symptoms: Excessive anxiety/ worry- yes and Difficulty controlling worry- yes      OBJECTIVE     Mental Status Evaluation  Patient's mood and affect were consistent with the context, and consistent with their baseline: Yes   Comments:  anxious, awake and alert; oriented to person, place, and time      Suicidal Ideation/Homicidal Ideation Risk Assessment: assessed. If not assessed, reason:   Risk Factors: Isolation or feeling isolated; physical health   Protective factors: Effective and accessible mental health care , Connectedness to individuals, family, community, and social institutions and Problem-solving and conflict resolution skills     Suicidal Ideation: Not Present      Estimate of Current Risk: Minimal risk     Plan for Safety developed 5/27/21-   If pt develops passive SI or HI, they agreed to spend time outside, garden, work on decorating her home, reaching out to her children and family. If pt develops active SI or HI (for example, SI with a plan or intent), they agreed to call the National Suicide Prevention Lifeline (1-749.846.1412) or text HOME to 083564, call 914 or go to the nearest ED or crisis center for an emergency psychiatric evaluation.     Interventions  Anxiety Reduction Techniques, Behavior Management and Cognitive Behavior Therapy  We discussed Mei's stress and depressive symptoms. Provided psychoeducation on the benefits of social support and explored how Mei can work to increase her social connection with others. Discussed how she has taking precautions to protect against COVID, such as wearing  masks and getting vaccinated. Discussed boundaries that may be beneficial to set with her son.       Psychotropic medications: n/a  Current Outpatient Medications   Medication Sig Dispense Refill   • cholecalciferol, vitamin D3, 5,000 unit (125 mcg) capsule Take 5,000 Units by mouth daily.       No current facility-administered medications for this visit.       ASSESSMENT       Progress  Patient's progress toward their goals is generally showing no change. Patient's symptomology is waxing and waning (Mei reported that she has felt more down with the change in season, and seems to be experiencing seasonal affective disorder symptoms. She appears to have limited social connection with others, with likely exacerbates her symptoms)       PLAN     Goals     •  To learn how to manage anxiety better (pt-stated)       Improve sleep             Recommendations  Individual Therapy  30 minutes2 times monthly    Next visit plan:  Follow up on homework  Follow up on if she started journaling (homework from last visit)  Explore grief related to her knee   Assess sleep (part of treatment goal is to improve sleep)

## 2021-12-02 NOTE — Clinical Note
I really encouraged Mei to increase her social connection because it seems to be lacking. She seems to be experiencing symptoms of Seasonal Affective Disorder.

## 2022-01-06 ENCOUNTER — TELEPHONE (OUTPATIENT)
Dept: FAMILY MEDICINE | Facility: CLINIC | Age: 67
End: 2022-01-06

## 2022-01-10 NOTE — TELEPHONE ENCOUNTER
Left third message and sent portal message advising patient to call to reschedule appointment with .

## 2022-06-16 ENCOUNTER — TELEPHONE (OUTPATIENT)
Dept: FAMILY MEDICINE | Facility: CLINIC | Age: 67
End: 2022-06-16
Payer: COMMERCIAL

## 2022-06-16 NOTE — TELEPHONE ENCOUNTER
Patient called to confirm appointment. Asked if she was experiencing  any SOB. She said no, she is just so congested that she is breathing through her mouth. States she will call if anything changes before appointment tomorrow.

## 2022-06-16 NOTE — TELEPHONE ENCOUNTER
Called patient back again to triage her breathing.  No answer; left message on VM reiterating if she is unable to speak through a sentence, if is experiencing SOB upon rest and/or CP presents, please refer to ED.

## 2022-06-16 NOTE — TELEPHONE ENCOUNTER
Patient is having labored breathing and trouble catching breath.    Patient having chills and sweating at night, says no fever. Headache, pressure ears and pain. Head congeston.  Cant catch breath, labored. Cough started yesterday Covid test negeative 6/14/22.     Tried back line about breathing issue and if want appt to be TM.

## 2022-06-16 NOTE — TELEPHONE ENCOUNTER
Called patient to further triage symptoms.  No answer; left message on VM that if patient cannot speak through a full sentence or if she's experiencing SOB at rest and/or any CP, refer to ED.  If/when patient calls back, please ask her when other symptoms started (cough started yesterday), is cough productive and if so, what color, any chest congestion, sore throat, dizziness, weakness, lightheadedness, N/V/D or CP.  Most likely SDS appointment or UC if no appointments available.  If red flag symptoms present, refer to ED.

## 2022-06-16 NOTE — TELEPHONE ENCOUNTER
Agree w/ all below. If sounds like COVID (fever, muscle aches, cough) and pt is in [place] please make appt telemed

## 2022-06-17 ENCOUNTER — OFFICE VISIT (OUTPATIENT)
Dept: FAMILY MEDICINE | Facility: CLINIC | Age: 67
End: 2022-06-17
Payer: COMMERCIAL

## 2022-06-17 ENCOUNTER — APPOINTMENT (OUTPATIENT)
Dept: LAB | Facility: HOSPITAL | Age: 67
End: 2022-06-17
Attending: FAMILY MEDICINE
Payer: COMMERCIAL

## 2022-06-17 ENCOUNTER — TELEPHONE (OUTPATIENT)
Dept: PRIMARY CARE | Facility: CLINIC | Age: 67
End: 2022-06-17
Payer: COMMERCIAL

## 2022-06-17 ENCOUNTER — HOSPITAL ENCOUNTER (OUTPATIENT)
Dept: RADIOLOGY | Facility: HOSPITAL | Age: 67
Discharge: HOME | End: 2022-06-17
Attending: FAMILY MEDICINE
Payer: COMMERCIAL

## 2022-06-17 VITALS
HEIGHT: 63 IN | HEART RATE: 91 BPM | WEIGHT: 174 LBS | BODY MASS INDEX: 30.83 KG/M2 | OXYGEN SATURATION: 94 % | SYSTOLIC BLOOD PRESSURE: 118 MMHG | DIASTOLIC BLOOD PRESSURE: 78 MMHG | TEMPERATURE: 98.2 F

## 2022-06-17 DIAGNOSIS — R05.9 COUGH: ICD-10-CM

## 2022-06-17 DIAGNOSIS — R05.9 COUGH: Primary | ICD-10-CM

## 2022-06-17 DIAGNOSIS — B34.9 VIRAL ILLNESS: ICD-10-CM

## 2022-06-17 LAB
ALBUMIN SERPL-MCNC: 3.4 G/DL (ref 3.4–5)
ALP SERPL-CCNC: 179 IU/L (ref 35–126)
ALT SERPL-CCNC: 40 IU/L (ref 11–54)
ANION GAP SERPL CALC-SCNC: 8 MEQ/L (ref 3–15)
AST SERPL-CCNC: 26 IU/L (ref 15–41)
BASOPHILS # BLD: 0.05 K/UL (ref 0.01–0.1)
BASOPHILS NFR BLD: 0.7 %
BILIRUB SERPL-MCNC: 0.7 MG/DL (ref 0.3–1.2)
BUN SERPL-MCNC: 15 MG/DL (ref 8–20)
CALCIUM SERPL-MCNC: 9 MG/DL (ref 8.9–10.3)
CHLORIDE SERPL-SCNC: 101 MEQ/L (ref 98–109)
CO2 SERPL-SCNC: 26 MEQ/L (ref 22–32)
CREAT SERPL-MCNC: 0.7 MG/DL (ref 0.6–1.1)
DIFFERENTIAL METHOD BLD: ABNORMAL
EOSINOPHIL # BLD: 0.18 K/UL (ref 0.04–0.36)
EOSINOPHIL NFR BLD: 2.4 %
ERYTHROCYTE [DISTWIDTH] IN BLOOD BY AUTOMATED COUNT: 12.4 % (ref 11.7–14.4)
ERYTHROCYTE [SEDIMENTATION RATE] IN BLOOD BY WESTERGREN METHOD: 48 MM/HR
FLUAV RNA SPEC QL NAA+PROBE: NEGATIVE
FLUBV RNA SPEC QL NAA+PROBE: NEGATIVE
GFR SERPL CREATININE-BSD FRML MDRD: >60 ML/MIN/1.73M*2
GLUCOSE SERPL-MCNC: 116 MG/DL (ref 70–99)
HCT VFR BLDCO AUTO: 37.2 % (ref 35–45)
HGB BLD-MCNC: 12.6 G/DL (ref 11.8–15.7)
IMM GRANULOCYTES # BLD AUTO: 0.06 K/UL (ref 0–0.08)
IMM GRANULOCYTES NFR BLD AUTO: 0.8 %
LYMPHOCYTES # BLD: 1.11 K/UL (ref 1.2–3.5)
LYMPHOCYTES NFR BLD: 14.6 %
MCH RBC QN AUTO: 31.3 PG (ref 28–33.2)
MCHC RBC AUTO-ENTMCNC: 33.9 G/DL (ref 32.2–35.5)
MCV RBC AUTO: 92.5 FL (ref 83–98)
MONOCYTES # BLD: 0.88 K/UL (ref 0.28–0.8)
MONOCYTES NFR BLD: 11.6 %
NEUTROPHILS # BLD: 5.33 K/UL (ref 1.7–7)
NEUTS SEG NFR BLD: 69.9 %
NRBC BLD-RTO: 0 %
PDW BLD AUTO: 9.5 FL (ref 9.4–12.3)
PLATELET # BLD AUTO: 376 K/UL (ref 150–369)
POTASSIUM SERPL-SCNC: 4.3 MEQ/L (ref 3.6–5.1)
PROT SERPL-MCNC: 6.7 G/DL (ref 6–8.2)
RBC # BLD AUTO: 4.02 M/UL (ref 3.93–5.22)
RSV RNA SPEC QL NAA+PROBE: NEGATIVE
SARS-COV-2 RNA RESP QL NAA+PROBE: NEGATIVE
SODIUM SERPL-SCNC: 135 MEQ/L (ref 136–144)
WBC # BLD AUTO: 7.61 K/UL (ref 3.8–10.5)

## 2022-06-17 PROCEDURE — 36415 COLL VENOUS BLD VENIPUNCTURE: CPT

## 2022-06-17 PROCEDURE — 82040 ASSAY OF SERUM ALBUMIN: CPT

## 2022-06-17 PROCEDURE — 85652 RBC SED RATE AUTOMATED: CPT

## 2022-06-17 PROCEDURE — 87637 SARSCOV2&INF A&B&RSV AMP PRB: CPT | Performed by: FAMILY MEDICINE

## 2022-06-17 PROCEDURE — 3008F BODY MASS INDEX DOCD: CPT | Performed by: FAMILY MEDICINE

## 2022-06-17 PROCEDURE — 85025 COMPLETE CBC W/AUTO DIFF WBC: CPT

## 2022-06-17 PROCEDURE — 99213 OFFICE O/P EST LOW 20 MIN: CPT | Performed by: FAMILY MEDICINE

## 2022-06-17 PROCEDURE — 71046 X-RAY EXAM CHEST 2 VIEWS: CPT

## 2022-06-17 RX ORDER — AZITHROMYCIN 250 MG/1
TABLET, FILM COATED ORAL
Qty: 6 TABLET | Refills: 0 | Status: SHIPPED | OUTPATIENT
Start: 2022-06-17 | End: 2022-06-22

## 2022-06-17 NOTE — TELEPHONE ENCOUNTER
ON CALL PHONE NOTE:  Pt called and herd the message she had a pneumonia and wanted to know what to do. I read Dr Spencer's note form today as well as her Message from the Chest Xray report. I reinforced with pt ceci stay the course, she can takle the Antibx which are approved for Tx Pneumonia, and to stay well hydrated and she soul take meds supportively if she gets a fecer in the next 24h before the antibx may kick in (TYL vs alternating IBU) and again reinforced importance of rest and staying well hydrated. Pt understood and agreed.

## 2022-06-17 NOTE — PROGRESS NOTES
"  Subjective      Patient ID: Sarina Lao is a 67 y.o. female.  1955      HPI   Has had about 10 days of symptoms.  Symptoms have sort of evolved over time.  The most consistent however is a headache and fatigue.  She has been congested.  She notes a periodic cough.  The cough just started 2 days ago  She notes some chills and elevated temperature.  She has been eating and drinking okay.  No nausea vomiting or diarrhea.  I have some shortness of breath but she feels like it is more because she has to breathe from her mouth inside of her nose      The following have been reviewed and updated as appropriate in this visit:   Tobacco  Allergies  Meds  Problems  Med Hx  Surg Hx  Fam Hx  Soc   Hx        Review of Systems   All other systems reviewed and are negative.      Objective     Vitals:    06/17/22 1054   BP: 118/78   BP Location: Right upper arm   Patient Position: Sitting   Pulse: 91   Temp: 36.8 °C (98.2 °F)   TempSrc: Oral   SpO2: 94%   Weight: 78.9 kg (174 lb)   Height: 1.6 m (5' 3\")     Body mass index is 30.82 kg/m².    Physical Exam  Vitals and nursing note reviewed.   Constitutional:       Appearance: Normal appearance.   HENT:      Head: Normocephalic and atraumatic.      Right Ear: Tympanic membrane normal.      Left Ear: Tympanic membrane normal.      Nose: Congestion present.   Eyes:      Conjunctiva/sclera: Conjunctivae normal.   Cardiovascular:      Rate and Rhythm: Normal rate and regular rhythm.      Heart sounds: Normal heart sounds.   Pulmonary:      Breath sounds: Normal breath sounds.      Comments: Increased work of breathing  Musculoskeletal:      Cervical back: Normal range of motion and neck supple.      Right lower leg: No edema.      Left lower leg: No edema.   Skin:     General: Skin is warm and dry.   Neurological:      General: No focal deficit present.      Mental Status: She is alert.   Psychiatric:         Mood and Affect: Mood normal.         Behavior: Behavior " normal.         Thought Content: Thought content normal.         Judgment: Judgment normal.         Assessment/Plan   Diagnoses and all orders for this visit:    Cough (Primary)  -     COVID- 19 PCR Symptomatic (includes FLU A/B & RSV) - [facility]  -     X-RAY CHEST 2 VIEWS; Future  -     C-reactive protein  -     Sedimentation rate, automated; Future  -     Comprehensive metabolic panel; Future  -     CBC and differential; Future    Viral illness  Assessment & Plan:  About a week of a significant viral infection  PCR COVID test done today  Will get stat labs and x-ray due to level of illness and patient  Exam was essentially within normal limits  Due to severity of illness and possibility of pneumonia we will treat with azithromycin for community-acquired pneumonia.  Supportive care discussed with patient.  Risk benefits alternatives and possible side effects discussed with patient.  Patient to reach out if worse anytime or if not better in 3 to 5 days    Orders:  -     X-RAY CHEST 2 VIEWS; Future  -     C-reactive protein  -     Sedimentation rate, automated; Future  -     Comprehensive metabolic panel; Future  -     CBC and differential; Future    Other orders  -     azithromycin (ZITHROMAX) 250 mg tablet; Take 2 tablets the first day, then 1 tablet daily for 4 days.

## 2022-06-17 NOTE — ASSESSMENT & PLAN NOTE
About a week of a significant viral infection  PCR COVID test done today  Will get stat labs and x-ray due to level of illness and patient  Exam was essentially within normal limits  Due to severity of illness and possibility of pneumonia we will treat with azithromycin for community-acquired pneumonia.  Supportive care discussed with patient.  Risk benefits alternatives and possible side effects discussed with patient.  Patient to reach out if worse anytime or if not better in 3 to 5 days

## 2022-06-27 NOTE — PROGRESS NOTES
Sarina Lao is a 67 y.o. female and is here for a comprehensive physical exam. In addition to health maintenance, the patient reports doing ok.  He was recently diagnosed with pneumonia.  Her cough is gone.  Her energy is not quite there yet she was treated about 10 days ago.    She notes from time to time she feels little anxious or fluttering in her chest.  It mostly seems to be work related.  During the evening as she relaxes it goes away    Patient Active Problem List   Diagnosis   • Arthritis   • Routine general medical examination at a health care facility   • Tinnitus of both ears   • Mixed hyperlipidemia   • Acquired hypothyroidism   • Decreased hearing of both ears   • Class 1 obesity due to excess calories without serious comorbidity with body mass index (BMI) of 30.0 to 30.9 in adult   • Daytime sleepiness   • Benign paroxysmal positional vertigo due to bilateral vestibular disorder   • Situational anxiety   • Allergic rhinitis   • SOCO (generalized anxiety disorder)                Allergies   Allergen Reactions   • No Known Allergies        Current Outpatient Medications on File Prior to Visit   Medication Sig Dispense Refill   • cholecalciferol, vitamin D3, 5,000 unit (125 mcg) capsule Take 5,000 Units by mouth daily.       No current facility-administered medications on file prior to visit.           Past Medical History:   Diagnosis Date   • Endometriosis    • Hypothyroidism        Past Surgical History:   Procedure Laterality Date   • DIAGNOSTIC LAPAROSCOPY         Family History   Problem Relation Age of Onset   • Heart failure Biological Mother 83        CHF   • Dementia Biological Mother    • Cancer Biological Father 70        mesothelioma   • Ovarian cancer Biological Sister        Social History     Socioeconomic History   • Marital status:      Spouse name: None   • Number of children: None   • Years of education: None   • Highest education level: None   Tobacco Use   • Smoking  "status: Never Smoker   • Smokeless tobacco: Never Used   Substance and Sexual Activity   • Alcohol use: Yes     Alcohol/week: 2.0 - 3.0 standard drinks     Types: 2 - 3 Glasses of wine per week   • Drug use: No   • Sexual activity: Yes     Partners: Male   Social History Narrative    Feels safe at home, lives w/ marcelobd       Nutrition and exercise history:   Exercise Plan: walking   Nutrition Plan:  balanced    Immunization History   Administered Date(s) Administered   • Influenza Vaccine Quadrivalent 3 Yr And Older 10/31/2021   • Influenza Vaccine Quadrivalent Preservative Free 3 Yr And Up 10/06/2019, 09/24/2020   • Influenza Vaccine Quadrivalent Preservative Free 6 Mons and Up 09/06/2018   • Influenza, Quadrivalent 10/06/2019   • Influenza, Unspecified 09/06/2018   • MMR 05/11/2019   • Sars-cov-2 (Covid-19) Vaccine, Pfizer 02/25/2021, 03/25/2021, 10/05/2021   • Tdap 10/13/2013   • Zoster 04/14/2017   • Zoster Vaccine Recombinant Adjuvanted (Shingrix) 06/22/2019, 09/01/2019       Review of systems:  All other systems reviewed and negative except as noted in the HPI.     Objective:  Visit Vitals  /72 (Patient Position: Sitting)   Pulse 79   Temp 36.9 °C (98.5 °F) (Oral)   Ht 1.6 m (5' 3\")   Wt 79 kg (174 lb 3.2 oz)   SpO2 96%   BMI 30.86 kg/m²       General Appearance:    Alert, cooperative, no distress, appears stated age   Head:    Normocephalic, without obvious abnormality, atraumatic   Eyes:    PERRL, conjunctiva/corneas clear, EOM's intact, fundi     benign, both eyes        Ears:    Normal TM's and external ear canals, both ears   Nose:   Nares normal, septum midline, mucosa normal, no drainage    or sinus   tenderness   Throat:   Lips, mucosa, and tongue normal; teeth and gums normal   Neck:   Supple, symmetrical, trachea midline, no adenopathy;        thyroid:  No enlargement/tenderness/nodules; no carotid    bruit or JVD   Back:     Symmetric, no curvature, ROM normal, no CVA tenderness   Lungs:     " Clear to auscultation bilaterally, respirations unlabored   Chest wall:    No tenderness or deformity   Heart:    Regular rate and rhythm, S1 and S2 normal, no murmur, rub   or gallop   Abdomen:     Soft, non-tender, bowel sounds active all four quadrants,     no masses, no organomegaly   Extremities:  Musculoskeletal:   Extremities normal, atraumatic, no cyanosis or edema    No injury or deformity   Pulses:   2+ and symmetric all extremities   Skin:   Skin color, texture, turgor normal, no rashes or lesions   Lymph nodes:   Cervical, supraclavicular, and axillary nodes normal   Neurologic:    Behavior/  Emotional:   CNII-XII intact. Normal strength, sensation and reflexes       throughout    Appropriate, cooperative       Assessment & Plan:    Problem List Items Addressed This Visit        Medium    Mixed hyperlipidemia (Chronic)    Relevant Orders    Comprehensive metabolic panel    Lipid panel    Acquired hypothyroidism - Primary (Chronic)    Relevant Orders    TSH w reflex FT4       Unprioritized    Routine general medical examination at a health care facility     Doing great with diet and exercise  Labs ordered  Mammogram ordered  Referred to [facility]  Patient will be scheduling colonoscopy  Due for Prevnar 20 but she is just getting over illness and will wait until feeling 100%             Other Visit Diagnoses     Fatigue, unspecified type        Relevant Orders    CBC and Differential    Need for vaccination        Encounter for screening mammogram for malignant neoplasm of breast        Relevant Orders    BI SCREENING MAMMOGRAM BILATERAL(TOMOSYNTHESIS)            Body mass index is 30.86 kg/m².      Current Outpatient Medications:   •  cholecalciferol, vitamin D3, 5,000 unit (125 mcg) capsule, Take 5,000 Units by mouth daily., Disp: , Rfl:     Follow up in one year

## 2022-06-28 ENCOUNTER — OFFICE VISIT (OUTPATIENT)
Dept: FAMILY MEDICINE | Facility: CLINIC | Age: 67
End: 2022-06-28
Payer: COMMERCIAL

## 2022-06-28 VITALS
BODY MASS INDEX: 30.87 KG/M2 | TEMPERATURE: 98.5 F | HEIGHT: 63 IN | SYSTOLIC BLOOD PRESSURE: 120 MMHG | HEART RATE: 79 BPM | WEIGHT: 174.2 LBS | DIASTOLIC BLOOD PRESSURE: 72 MMHG | OXYGEN SATURATION: 96 %

## 2022-06-28 DIAGNOSIS — E78.2 MIXED HYPERLIPIDEMIA: Chronic | ICD-10-CM

## 2022-06-28 DIAGNOSIS — Z23 NEED FOR VACCINATION: ICD-10-CM

## 2022-06-28 DIAGNOSIS — Z12.31 ENCOUNTER FOR SCREENING MAMMOGRAM FOR MALIGNANT NEOPLASM OF BREAST: ICD-10-CM

## 2022-06-28 DIAGNOSIS — Z00.00 ROUTINE GENERAL MEDICAL EXAMINATION AT A HEALTH CARE FACILITY: Primary | ICD-10-CM

## 2022-06-28 DIAGNOSIS — R53.83 FATIGUE, UNSPECIFIED TYPE: ICD-10-CM

## 2022-06-28 DIAGNOSIS — E03.9 ACQUIRED HYPOTHYROIDISM: Chronic | ICD-10-CM

## 2022-06-28 PROBLEM — L24.89 IRRITANT CONTACT DERMATITIS DUE TO OTHER AGENTS: Status: RESOLVED | Noted: 2020-05-01 | Resolved: 2022-06-28

## 2022-06-28 PROBLEM — B34.9 VIRAL ILLNESS: Status: RESOLVED | Noted: 2022-06-17 | Resolved: 2022-06-28

## 2022-06-28 PROBLEM — R10.84 GENERALIZED ABDOMINAL PAIN: Status: RESOLVED | Noted: 2021-03-29 | Resolved: 2022-06-28

## 2022-06-28 PROCEDURE — 3008F BODY MASS INDEX DOCD: CPT | Performed by: FAMILY MEDICINE

## 2022-06-28 PROCEDURE — 99397 PER PM REEVAL EST PAT 65+ YR: CPT | Performed by: FAMILY MEDICINE

## 2022-06-28 NOTE — ASSESSMENT & PLAN NOTE
Doing great with diet and exercise  Labs ordered  Mammogram ordered  Referred to [facility]  Patient will be scheduling colonoscopy  Due for Prevnar 20 but she is just getting over illness and will wait until feeling 100%

## 2022-07-07 ENCOUNTER — APPOINTMENT (OUTPATIENT)
Dept: LAB | Facility: CLINIC | Age: 67
End: 2022-07-07
Attending: FAMILY MEDICINE
Payer: COMMERCIAL

## 2022-07-07 DIAGNOSIS — E03.9 HYPOTHYROIDISM, UNSPECIFIED: ICD-10-CM

## 2022-07-07 DIAGNOSIS — R53.83 OTHER FATIGUE: ICD-10-CM

## 2022-07-07 DIAGNOSIS — E78.2 MIXED HYPERLIPIDEMIA: ICD-10-CM

## 2022-07-07 PROCEDURE — 30099997 SPECIMEN PROCESSING

## 2022-07-08 LAB
ALBUMIN SERPL-MCNC: 4.1 G/DL (ref 3.6–5.1)
ALBUMIN/GLOB SERPL: 1.6 (CALC) (ref 1–2.5)
ALP SERPL-CCNC: 75 U/L (ref 37–153)
ALT SERPL-CCNC: 14 U/L (ref 6–29)
AST SERPL-CCNC: 17 U/L (ref 10–35)
BASOPHILS # BLD AUTO: 41 CELLS/UL (ref 0–200)
BASOPHILS NFR BLD AUTO: 0.9 %
BILIRUB SERPL-MCNC: 0.7 MG/DL (ref 0.2–1.2)
BUN SERPL-MCNC: 18 MG/DL (ref 7–25)
BUN/CREAT SERPL: ABNORMAL (CALC) (ref 6–22)
CALCIUM SERPL-MCNC: 9.4 MG/DL (ref 8.6–10.4)
CHLORIDE SERPL-SCNC: 105 MMOL/L (ref 98–110)
CHOLEST SERPL-MCNC: 225 MG/DL
CHOLEST/HDLC SERPL: 2.7 (CALC)
CO2 SERPL-SCNC: 28 MMOL/L (ref 20–32)
CREAT SERPL-MCNC: 0.75 MG/DL (ref 0.5–0.99)
EOSINOPHIL # BLD AUTO: 230 CELLS/UL (ref 15–500)
EOSINOPHIL NFR BLD AUTO: 5 %
ERYTHROCYTE [DISTWIDTH] IN BLOOD BY AUTOMATED COUNT: 13 % (ref 11–15)
GLOBULIN SER CALC-MCNC: 2.5 G/DL (CALC) (ref 1.9–3.7)
GLUCOSE SERPL-MCNC: 105 MG/DL (ref 65–99)
HCT VFR BLD AUTO: 39.7 % (ref 35–45)
HDLC SERPL-MCNC: 82 MG/DL
HGB BLD-MCNC: 13.3 G/DL (ref 11.7–15.5)
LDLC SERPL CALC-MCNC: 127 MG/DL (CALC)
LYMPHOCYTES # BLD AUTO: 1573 CELLS/UL (ref 850–3900)
LYMPHOCYTES NFR BLD AUTO: 34.2 %
MCH RBC QN AUTO: 31.5 PG (ref 27–33)
MCHC RBC AUTO-ENTMCNC: 33.5 G/DL (ref 32–36)
MCV RBC AUTO: 94.1 FL (ref 80–100)
MONOCYTES # BLD AUTO: 331 CELLS/UL (ref 200–950)
MONOCYTES NFR BLD AUTO: 7.2 %
NEUTROPHILS # BLD AUTO: 2424 CELLS/UL (ref 1500–7800)
NEUTROPHILS NFR BLD AUTO: 52.7 %
NONHDLC SERPL-MCNC: 143 MG/DL (CALC)
PLATELET # BLD AUTO: 241 THOUSAND/UL (ref 140–400)
PMV BLD REES-ECKER: 11.3 FL (ref 7.5–12.5)
POTASSIUM SERPL-SCNC: 4.2 MMOL/L (ref 3.5–5.3)
PROT SERPL-MCNC: 6.6 G/DL (ref 6.1–8.1)
RBC # BLD AUTO: 4.22 MILLION/UL (ref 3.8–5.1)
SODIUM SERPL-SCNC: 141 MMOL/L (ref 135–146)
TRIGL SERPL-MCNC: 66 MG/DL
TSH SERPL-ACNC: 3.36 MIU/L (ref 0.4–4.5)
WBC # BLD AUTO: 4.6 THOUSAND/UL (ref 3.8–10.8)

## 2022-07-11 ENCOUNTER — TELEPHONE (OUTPATIENT)
Dept: FAMILY MEDICINE | Facility: CLINIC | Age: 67
End: 2022-07-11
Payer: COMMERCIAL

## 2022-07-11 NOTE — TELEPHONE ENCOUNTER
Patient advised and understood. She noted she's had excessive fatigue as well as dizziness which were discussed at annual and is curious as to if any of these lab results correlate with anything

## 2022-07-11 NOTE — TELEPHONE ENCOUNTER
Please call patient and see if she will write in a [employer] message letting me know more about the dizziness(when is it the worst? How often? How long does it last?) and the fatigue.  Please also ask her to include her sleep habits as well as general diet habits.  Thank you

## 2022-11-28 ENCOUNTER — TELEPHONE (OUTPATIENT)
Dept: BEHAVIORAL HEALTH | Facility: CLINIC | Age: 67
End: 2022-11-28
Payer: COMMERCIAL

## 2022-11-28 NOTE — TELEPHONE ENCOUNTER
Hi, I was looking ahead at my schedule and I see Mei was scheduled with me for an hour follow up on 12/27. I last saw her about a year ago, so she would need to be scheduled with me in a new patient slot. It looks like I have an open new patient slot on 12/8 at 2:00 - please offer her that slot. If she can only do the 3:30 time, that's okay, we can leave it.

## 2022-11-29 ENCOUNTER — TELEPHONE (OUTPATIENT)
Dept: FAMILY MEDICINE | Facility: CLINIC | Age: 67
End: 2022-11-29
Payer: COMMERCIAL

## 2022-11-29 NOTE — TELEPHONE ENCOUNTER
LVM for patient to call us in regards to her next appointment on 12/27. Please offer her the slot I have on hold( the NPV on 12/8)

## 2023-06-13 ENCOUNTER — TRANSCRIBE ORDERS (OUTPATIENT)
Dept: SCHEDULING | Age: 68
End: 2023-06-13

## 2023-06-13 DIAGNOSIS — M66.171: Primary | ICD-10-CM

## 2023-07-10 NOTE — PROGRESS NOTES
Sarina Lao is a 68 y.o. female and is here for a comprehensive physical exam. In addition to health maintenance, the patient reports the following     Has tendon tear in med ankle and has been in a boot and getting a brace fitted today  She is trying to move  She is working full time    She is very nervous about flying across country as she has the ankle injury and had trouble w/ last flight. Her dog really calms her     Patient Active Problem List   Diagnosis   • Arthritis   • Routine general medical examination at a health care facility   • Tinnitus of both ears   • Mixed hyperlipidemia   • Acquired hypothyroidism   • Decreased hearing of both ears   • Class 1 obesity due to excess calories without serious comorbidity with body mass index (BMI) of 30.0 to 30.9 in adult   • Daytime sleepiness   • Benign paroxysmal positional vertigo due to bilateral vestibular disorder   • Situational anxiety   • Allergic rhinitis   • SOCO (generalized anxiety disorder)   • Irritable colon   • Posterior tibial tendon dysfunction (PTTD) of right lower extremity                Allergies   Allergen Reactions   • No Known Allergies        Current Outpatient Medications on File Prior to Visit   Medication Sig Dispense Refill   • celecoxib (celeBREX) 200 mg capsule      • cholecalciferol, vitamin D3, 5,000 unit (125 mcg) capsule Take 5,000 Units by mouth daily.       No current facility-administered medications on file prior to visit.       Past Medical History:   Diagnosis Date   • Endometriosis    • Hypothyroidism        Past Surgical History:   Procedure Laterality Date   • DIAGNOSTIC LAPAROSCOPY         Family History   Problem Relation Age of Onset   • Heart failure Biological Mother 83        CHF   • Dementia Biological Mother    • Cancer Biological Father 70        mesothelioma   • Ovarian cancer Biological Sister        Social History     Socioeconomic History   • Marital status:      Spouse name: None   • Number of  "children: None   • Years of education: None   • Highest education level: None   Tobacco Use   • Smoking status: Never   • Smokeless tobacco: Never   Substance and Sexual Activity   • Alcohol use: Yes     Alcohol/week: 2.0 - 3.0 standard drinks of alcohol     Types: 2 - 3 Glasses of wine per week   • Drug use: No   • Sexual activity: Yes     Partners: Male   Social History Narrative    Feels safe at home, lives w/     Moving to [place] end of 7/23         Immunization History   Administered Date(s) Administered   • Influenza Vaccine Quadrivalent 3 Yr And Older 10/31/2021   • Influenza Vaccine Quadrivalent Preservative Free 3 Yr And Up 10/06/2019, 09/24/2020   • Influenza Vaccine Quadrivalent Preservative Free 6 Mons and Up 09/06/2018   • Influenza, Live, Intranasal, Quadrivalent 10/06/2019   • Influenza, Unspecified 09/06/2018   • MMR 05/11/2019   • Pneumococcal Conjugate 20-Valent 07/11/2023   • SARS-COV-2 (COVID19) VACCINE, PFIZER MONOVALENT 02/25/2021, 03/25/2021, 10/05/2021   • Tdap 10/13/2013   • Zoster 04/14/2017   • Zoster Vaccine Recombinant Adjuvanted (Shingrix) 06/22/2019, 09/01/2019       Review of systems:  All other systems reviewed and negative except as noted in the HPI.     Objective:  Visit Vitals  /78 (BP Location: Left upper arm, Patient Position: Sitting)   Pulse 80   Temp 36.9 °C (98.5 °F) (Temporal)   Ht 1.6 m (5' 3\")   Wt 84.4 kg (186 lb)   SpO2 99%   BMI 32.95 kg/m²       General Appearance:    Alert, cooperative, no distress, appears stated age   Head:    Normocephalic, without obvious abnormality, atraumatic   Eyes:    PERRL, conjunctiva/corneas clear, EOM's intact, fundi     benign, both eyes        Ears:    Normal TM's and external ear canals, both ears   Nose:   Nares normal, septum midline, mucosa normal, no drainage    or sinus   tenderness   Throat:   Lips, mucosa, and tongue normal; teeth and gums normal   Neck:   Supple, symmetrical, trachea midline, no adenopathy;       "  thyroid:  No enlargement/tenderness/nodules; no carotid    bruit or JVD   Back:     Symmetric, no curvature, ROM normal, no CVA tenderness   Lungs:     Clear to auscultation bilaterally, respirations unlabored   Chest wall:    No tenderness or deformity   Heart:    Regular rate and rhythm, S1 and S2 normal, no murmur, rub   or gallop   Abdomen:     Soft, non-tender, bowel sounds active all four quadrants,     no masses, no organomegaly   Extremities:  Musculoskeletal:   Extremities normal, atraumatic, no cyanosis or edema    No injury or deformity   Pulses:   2+ and symmetric all extremities   Skin:   Skin color, texture, turgor normal, no rashes or lesions   Lymph nodes:   Cervical, supraclavicular, and axillary nodes normal   Neurologic:    Behavior/  Emotional:   CNII-XII intact. Normal strength, sensation and reflexes       throughout    Appropriate, cooperative       Assessment & Plan:    Problem List Items Addressed This Visit        Medium    Mixed hyperlipidemia (Chronic)    Relevant Orders    Comprehensive metabolic panel    Lipid panel    Acquired hypothyroidism - Primary (Chronic)    Relevant Orders    TSH w reflex FT4       Unprioritized    Routine general medical examination at a health care facility     Patient has been limited in her activity due to ankle fracture and then tendon dysfunction.  She is currently in a boot  Health maintenance items reviewed and updated as needed.  Patient is preparing to move across country and at this time we will get labs but other health maintenance items will have to wait till she gets to her new location         SOCO (generalized anxiety disorder)     Patient not currently on medication.  She is getting ready to move and is concerned that she will have anxiety with the flight.  Patient asked if I would write for her dog to be an emotional support animal.  A replied if she got them officially registered as an emotional support animal would look over the documentation  aside that time         Posterior tibial tendon dysfunction (PTTD) of right lower extremity   Other Visit Diagnoses     Fatigue, unspecified type        Relevant Orders    CBC and Differential    Encounter for screening mammogram for malignant neoplasm of breast        Relevant Orders    BI SCREENING MAMMOGRAM BILATERAL(TOMOSYNTHESIS)    Age related osteoporosis, unspecified pathological fracture presence        Need for vaccination        Relevant Orders    Pneumococcal conjugate vaccine 20-valent IM (Completed)    Vitamin D deficiency        Relevant Orders    Vitamin D 25 hydroxy            Body mass index is 32.95 kg/m².  BMI is elevated. Plan: management and follow-up addressed with patient.      Current Outpatient Medications:   •  celecoxib (celeBREX) 200 mg capsule, , Disp: , Rfl:   •  cholecalciferol, vitamin D3, 5,000 unit (125 mcg) capsule, Take 5,000 Units by mouth daily., Disp: , Rfl:     Follow up as needed. pt is moving to [place]

## 2023-07-11 ENCOUNTER — OFFICE VISIT (OUTPATIENT)
Dept: FAMILY MEDICINE | Facility: CLINIC | Age: 68
End: 2023-07-11
Payer: COMMERCIAL

## 2023-07-11 VITALS
OXYGEN SATURATION: 99 % | TEMPERATURE: 98.5 F | WEIGHT: 186 LBS | HEART RATE: 80 BPM | HEIGHT: 63 IN | SYSTOLIC BLOOD PRESSURE: 114 MMHG | BODY MASS INDEX: 32.96 KG/M2 | DIASTOLIC BLOOD PRESSURE: 78 MMHG

## 2023-07-11 DIAGNOSIS — E78.2 MIXED HYPERLIPIDEMIA: Chronic | ICD-10-CM

## 2023-07-11 DIAGNOSIS — M76.821 POSTERIOR TIBIAL TENDON DYSFUNCTION (PTTD) OF RIGHT LOWER EXTREMITY: ICD-10-CM

## 2023-07-11 DIAGNOSIS — Z12.31 ENCOUNTER FOR SCREENING MAMMOGRAM FOR MALIGNANT NEOPLASM OF BREAST: ICD-10-CM

## 2023-07-11 DIAGNOSIS — F41.1 GAD (GENERALIZED ANXIETY DISORDER): ICD-10-CM

## 2023-07-11 DIAGNOSIS — E03.9 ACQUIRED HYPOTHYROIDISM: Chronic | ICD-10-CM

## 2023-07-11 DIAGNOSIS — Z23 NEED FOR VACCINATION: ICD-10-CM

## 2023-07-11 DIAGNOSIS — Z00.00 ROUTINE GENERAL MEDICAL EXAMINATION AT A HEALTH CARE FACILITY: Primary | ICD-10-CM

## 2023-07-11 DIAGNOSIS — M81.0 AGE RELATED OSTEOPOROSIS, UNSPECIFIED PATHOLOGICAL FRACTURE PRESENCE: ICD-10-CM

## 2023-07-11 DIAGNOSIS — R53.83 FATIGUE, UNSPECIFIED TYPE: ICD-10-CM

## 2023-07-11 DIAGNOSIS — E55.9 VITAMIN D DEFICIENCY: ICD-10-CM

## 2023-07-11 PROCEDURE — 99397 PER PM REEVAL EST PAT 65+ YR: CPT | Mod: 25 | Performed by: FAMILY MEDICINE

## 2023-07-11 PROCEDURE — 90677 PCV20 VACCINE IM: CPT | Performed by: FAMILY MEDICINE

## 2023-07-11 PROCEDURE — 90471 IMMUNIZATION ADMIN: CPT | Performed by: FAMILY MEDICINE

## 2023-07-11 PROCEDURE — 3008F BODY MASS INDEX DOCD: CPT | Performed by: FAMILY MEDICINE

## 2023-07-11 RX ORDER — CELECOXIB 200 MG/1
CAPSULE ORAL
COMMUNITY
Start: 2023-06-20

## 2023-07-12 PROBLEM — M76.821 POSTERIOR TIBIAL TENDON DYSFUNCTION (PTTD) OF RIGHT LOWER EXTREMITY: Status: ACTIVE | Noted: 2023-06-13

## 2023-07-12 PROBLEM — K58.9 IRRITABLE COLON: Status: ACTIVE | Noted: 2022-02-16

## 2023-07-12 NOTE — ASSESSMENT & PLAN NOTE
Patient has been limited in her activity due to ankle fracture and then tendon dysfunction.  She is currently in a boot  Health maintenance items reviewed and updated as needed.  Patient is preparing to move across country and at this time we will get labs but other health maintenance items will have to wait till she gets to her new location

## 2023-07-12 NOTE — ASSESSMENT & PLAN NOTE
Patient not currently on medication.  She is getting ready to move and is concerned that she will have anxiety with the flight.  Patient asked if I would write for her dog to be an emotional support animal.  A replied if she got them officially registered as an emotional support animal would look over the documentation aside that time

## 2023-08-10 ENCOUNTER — TRANSCRIBE ORDERS (OUTPATIENT)
Dept: LAB | Facility: CLINIC | Age: 68
End: 2023-08-10

## 2023-08-10 ENCOUNTER — APPOINTMENT (OUTPATIENT)
Dept: LAB | Facility: CLINIC | Age: 68
End: 2023-08-10
Attending: FAMILY MEDICINE
Payer: COMMERCIAL

## 2023-08-10 DIAGNOSIS — E03.9 HYPOTHYROIDISM, UNSPECIFIED: ICD-10-CM

## 2023-08-10 DIAGNOSIS — R53.83 OTHER FATIGUE: ICD-10-CM

## 2023-08-10 DIAGNOSIS — E78.2 MIXED HYPERLIPIDEMIA: ICD-10-CM

## 2023-08-10 DIAGNOSIS — E55.9 VITAMIN D DEFICIENCY, UNSPECIFIED: ICD-10-CM

## 2023-08-10 DIAGNOSIS — E03.9 HYPOTHYROIDISM, UNSPECIFIED: Primary | ICD-10-CM

## 2023-08-10 LAB
25(OH)D3 SERPL-MCNC: 51 NG/ML (ref 30–100)
ALBUMIN SERPL-MCNC: 4.1 G/DL (ref 3.5–5.7)
ALP SERPL-CCNC: 53 IU/L (ref 34–125)
ALT SERPL-CCNC: 14 IU/L (ref 7–52)
ANION GAP SERPL CALC-SCNC: 5 MEQ/L (ref 3–15)
AST SERPL-CCNC: 14 IU/L (ref 13–39)
BASOPHILS # BLD: 0.05 K/UL (ref 0.01–0.1)
BASOPHILS NFR BLD: 0.9 %
BILIRUB SERPL-MCNC: 0.6 MG/DL (ref 0.3–1.2)
BUN SERPL-MCNC: 21 MG/DL (ref 7–25)
CALCIUM SERPL-MCNC: 9.1 MG/DL (ref 8.6–10.3)
CHLORIDE SERPL-SCNC: 106 MEQ/L (ref 98–107)
CHOLEST SERPL-MCNC: 214 MG/DL
CO2 SERPL-SCNC: 30 MEQ/L (ref 21–31)
CREAT SERPL-MCNC: 0.7 MG/DL (ref 0.6–1.2)
DIFFERENTIAL METHOD BLD: NORMAL
EOSINOPHIL # BLD: 0.18 K/UL (ref 0.04–0.36)
EOSINOPHIL NFR BLD: 3.4 %
ERYTHROCYTE [DISTWIDTH] IN BLOOD BY AUTOMATED COUNT: 13.3 % (ref 11.7–14.4)
GFR SERPL CREATININE-BSD FRML MDRD: >60 ML/MIN/1.73M*2
GLUCOSE SERPL-MCNC: 110 MG/DL (ref 70–99)
HCT VFR BLDCO AUTO: 42 % (ref 35–45)
HDLC SERPL-MCNC: 70 MG/DL
HDLC SERPL: 3.1 {RATIO}
HGB BLD-MCNC: 13.7 G/DL (ref 11.8–15.7)
IMM GRANULOCYTES # BLD AUTO: 0.02 K/UL (ref 0–0.08)
IMM GRANULOCYTES NFR BLD AUTO: 0.4 %
LDLC SERPL CALC-MCNC: 121 MG/DL
LYMPHOCYTES # BLD: 1.79 K/UL (ref 1.2–3.5)
LYMPHOCYTES NFR BLD: 33.5 %
MCH RBC QN AUTO: 31 PG (ref 28–33.2)
MCHC RBC AUTO-ENTMCNC: 32.6 G/DL (ref 32.2–35.5)
MCV RBC AUTO: 95 FL (ref 83–98)
MONOCYTES # BLD: 0.48 K/UL (ref 0.28–0.8)
MONOCYTES NFR BLD: 9 %
NEUTROPHILS # BLD: 2.83 K/UL (ref 1.7–7)
NEUTS SEG NFR BLD: 52.8 %
NONHDLC SERPL-MCNC: 144 MG/DL
NRBC BLD-RTO: 0 %
PDW BLD AUTO: 11.6 FL (ref 9.4–12.3)
PLATELET # BLD AUTO: 248 K/UL (ref 150–369)
POTASSIUM SERPL-SCNC: 4.5 MEQ/L (ref 3.5–5.1)
PROT SERPL-MCNC: 6.5 G/DL (ref 6–8.2)
RBC # BLD AUTO: 4.42 M/UL (ref 3.93–5.22)
SODIUM SERPL-SCNC: 141 MEQ/L (ref 136–145)
TRIGL SERPL-MCNC: 114 MG/DL
TSH SERPL DL<=0.05 MIU/L-ACNC: 4.16 MIU/L (ref 0.34–5.6)
WBC # BLD AUTO: 5.35 K/UL (ref 3.8–10.5)

## 2023-08-10 PROCEDURE — 82306 VITAMIN D 25 HYDROXY: CPT

## 2023-08-10 PROCEDURE — 85025 COMPLETE CBC W/AUTO DIFF WBC: CPT

## 2023-08-10 PROCEDURE — 80053 COMPREHEN METABOLIC PANEL: CPT

## 2023-08-10 PROCEDURE — 36415 COLL VENOUS BLD VENIPUNCTURE: CPT

## 2023-08-10 PROCEDURE — 80061 LIPID PANEL: CPT

## 2023-08-10 PROCEDURE — 84443 ASSAY THYROID STIM HORMONE: CPT
